# Patient Record
Sex: FEMALE | Race: BLACK OR AFRICAN AMERICAN | NOT HISPANIC OR LATINO
[De-identification: names, ages, dates, MRNs, and addresses within clinical notes are randomized per-mention and may not be internally consistent; named-entity substitution may affect disease eponyms.]

---

## 2018-04-06 ENCOUNTER — APPOINTMENT (OUTPATIENT)
Dept: CARDIOLOGY | Facility: CLINIC | Age: 73
End: 2018-04-06

## 2019-03-22 ENCOUNTER — EMERGENCY (EMERGENCY)
Facility: HOSPITAL | Age: 74
LOS: 1 days | Discharge: HOME | End: 2019-03-22
Attending: EMERGENCY MEDICINE | Admitting: EMERGENCY MEDICINE
Payer: COMMERCIAL

## 2019-03-22 VITALS
RESPIRATION RATE: 20 BRPM | DIASTOLIC BLOOD PRESSURE: 66 MMHG | TEMPERATURE: 100 F | HEART RATE: 66 BPM | SYSTOLIC BLOOD PRESSURE: 146 MMHG | OXYGEN SATURATION: 96 %

## 2019-03-22 LAB
ALBUMIN SERPL ELPH-MCNC: 4 G/DL — SIGNIFICANT CHANGE UP (ref 3.3–5)
ALP SERPL-CCNC: 87 U/L — SIGNIFICANT CHANGE UP (ref 40–120)
ALT FLD-CCNC: 23 U/L — SIGNIFICANT CHANGE UP (ref 4–33)
ANION GAP SERPL CALC-SCNC: 15 MMO/L — HIGH (ref 7–14)
ANION GAP SERPL CALC-SCNC: 17 MMO/L — HIGH (ref 7–14)
AST SERPL-CCNC: 46 U/L — HIGH (ref 4–32)
B PERT DNA SPEC QL NAA+PROBE: NOT DETECTED — SIGNIFICANT CHANGE UP
BASE EXCESS BLDV CALC-SCNC: 8.7 MMOL/L — SIGNIFICANT CHANGE UP
BASOPHILS # BLD AUTO: 0.03 K/UL — SIGNIFICANT CHANGE UP (ref 0–0.2)
BASOPHILS NFR BLD AUTO: 0.3 % — SIGNIFICANT CHANGE UP (ref 0–2)
BILIRUB SERPL-MCNC: 0.3 MG/DL — SIGNIFICANT CHANGE UP (ref 0.2–1.2)
BLOOD GAS VENOUS - CREATININE: 0.63 MG/DL — SIGNIFICANT CHANGE UP (ref 0.5–1.3)
BUN SERPL-MCNC: 14 MG/DL — SIGNIFICANT CHANGE UP (ref 7–23)
BUN SERPL-MCNC: 15 MG/DL — SIGNIFICANT CHANGE UP (ref 7–23)
C PNEUM DNA SPEC QL NAA+PROBE: NOT DETECTED — SIGNIFICANT CHANGE UP
CALCIUM SERPL-MCNC: 8.3 MG/DL — LOW (ref 8.4–10.5)
CALCIUM SERPL-MCNC: 9.1 MG/DL — SIGNIFICANT CHANGE UP (ref 8.4–10.5)
CHLORIDE BLDV-SCNC: 97 MMOL/L — SIGNIFICANT CHANGE UP (ref 96–108)
CHLORIDE SERPL-SCNC: 95 MMOL/L — LOW (ref 98–107)
CHLORIDE SERPL-SCNC: 98 MMOL/L — SIGNIFICANT CHANGE UP (ref 98–107)
CO2 SERPL-SCNC: 21 MMOL/L — LOW (ref 22–31)
CO2 SERPL-SCNC: 28 MMOL/L — SIGNIFICANT CHANGE UP (ref 22–31)
CREAT SERPL-MCNC: 0.79 MG/DL — SIGNIFICANT CHANGE UP (ref 0.5–1.3)
CREAT SERPL-MCNC: 0.84 MG/DL — SIGNIFICANT CHANGE UP (ref 0.5–1.3)
EOSINOPHIL # BLD AUTO: 0 K/UL — SIGNIFICANT CHANGE UP (ref 0–0.5)
EOSINOPHIL NFR BLD AUTO: 0 % — SIGNIFICANT CHANGE UP (ref 0–6)
FLUAV H1 2009 PAND RNA SPEC QL NAA+PROBE: NOT DETECTED — SIGNIFICANT CHANGE UP
FLUAV H1 RNA SPEC QL NAA+PROBE: NOT DETECTED — SIGNIFICANT CHANGE UP
FLUAV H3 RNA SPEC QL NAA+PROBE: NOT DETECTED — SIGNIFICANT CHANGE UP
FLUAV SUBTYP SPEC NAA+PROBE: NOT DETECTED — SIGNIFICANT CHANGE UP
FLUBV RNA SPEC QL NAA+PROBE: NOT DETECTED — SIGNIFICANT CHANGE UP
GAS PNL BLDV: 139 MMOL/L — SIGNIFICANT CHANGE UP (ref 136–146)
GLUCOSE BLDV-MCNC: 101 — HIGH (ref 70–99)
GLUCOSE SERPL-MCNC: 232 MG/DL — HIGH (ref 70–99)
GLUCOSE SERPL-MCNC: 91 MG/DL — SIGNIFICANT CHANGE UP (ref 70–99)
HADV DNA SPEC QL NAA+PROBE: NOT DETECTED — SIGNIFICANT CHANGE UP
HBA1C BLD-MCNC: 5.2 % — SIGNIFICANT CHANGE UP (ref 4–5.6)
HCO3 BLDV-SCNC: 30 MMOL/L — HIGH (ref 20–27)
HCOV PNL SPEC NAA+PROBE: SIGNIFICANT CHANGE UP
HCT VFR BLD CALC: 40.7 % — SIGNIFICANT CHANGE UP (ref 34.5–45)
HCT VFR BLDV CALC: 38.7 % — SIGNIFICANT CHANGE UP (ref 34.5–45)
HGB BLD-MCNC: 12.1 G/DL — SIGNIFICANT CHANGE UP (ref 11.5–15.5)
HGB BLDV-MCNC: 12.6 G/DL — SIGNIFICANT CHANGE UP (ref 11.5–15.5)
HMPV RNA SPEC QL NAA+PROBE: DETECTED — HIGH
HPIV1 RNA SPEC QL NAA+PROBE: NOT DETECTED — SIGNIFICANT CHANGE UP
HPIV2 RNA SPEC QL NAA+PROBE: NOT DETECTED — SIGNIFICANT CHANGE UP
HPIV3 RNA SPEC QL NAA+PROBE: NOT DETECTED — SIGNIFICANT CHANGE UP
HPIV4 RNA SPEC QL NAA+PROBE: NOT DETECTED — SIGNIFICANT CHANGE UP
IMM GRANULOCYTES NFR BLD AUTO: 1.2 % — SIGNIFICANT CHANGE UP (ref 0–1.5)
LACTATE BLDV-MCNC: 2.2 MMOL/L — HIGH (ref 0.5–2)
LYMPHOCYTES # BLD AUTO: 2.18 K/UL — SIGNIFICANT CHANGE UP (ref 1–3.3)
LYMPHOCYTES # BLD AUTO: 23.1 % — SIGNIFICANT CHANGE UP (ref 13–44)
MAGNESIUM SERPL-MCNC: 2.5 MG/DL — SIGNIFICANT CHANGE UP (ref 1.6–2.6)
MCHC RBC-ENTMCNC: 29.7 % — LOW (ref 32–36)
MCHC RBC-ENTMCNC: 29.7 PG — SIGNIFICANT CHANGE UP (ref 27–34)
MCV RBC AUTO: 100 FL — SIGNIFICANT CHANGE UP (ref 80–100)
MONOCYTES # BLD AUTO: 1.3 K/UL — HIGH (ref 0–0.9)
MONOCYTES NFR BLD AUTO: 13.8 % — SIGNIFICANT CHANGE UP (ref 2–14)
NEUTROPHILS # BLD AUTO: 5.82 K/UL — SIGNIFICANT CHANGE UP (ref 1.8–7.4)
NEUTROPHILS NFR BLD AUTO: 61.6 % — SIGNIFICANT CHANGE UP (ref 43–77)
NRBC # FLD: 0 K/UL — LOW (ref 25–125)
NT-PROBNP SERPL-SCNC: 1970 PG/ML — SIGNIFICANT CHANGE UP
PCO2 BLDV: 63 MMHG — HIGH (ref 41–51)
PH BLDV: 7.36 PH — SIGNIFICANT CHANGE UP (ref 7.32–7.43)
PLATELET # BLD AUTO: 315 K/UL — SIGNIFICANT CHANGE UP (ref 150–400)
PMV BLD: 10.6 FL — SIGNIFICANT CHANGE UP (ref 7–13)
PO2 BLDV: 39 MMHG — SIGNIFICANT CHANGE UP (ref 35–40)
POTASSIUM BLDV-SCNC: 5.1 MMOL/L — HIGH (ref 3.4–4.5)
POTASSIUM SERPL-MCNC: 4 MMOL/L — SIGNIFICANT CHANGE UP (ref 3.5–5.3)
POTASSIUM SERPL-MCNC: 5.6 MMOL/L — HIGH (ref 3.5–5.3)
POTASSIUM SERPL-SCNC: 4 MMOL/L — SIGNIFICANT CHANGE UP (ref 3.5–5.3)
POTASSIUM SERPL-SCNC: 5.6 MMOL/L — HIGH (ref 3.5–5.3)
PROT SERPL-MCNC: 8.7 G/DL — HIGH (ref 6–8.3)
RBC # BLD: 4.07 M/UL — SIGNIFICANT CHANGE UP (ref 3.8–5.2)
RBC # FLD: 12.9 % — SIGNIFICANT CHANGE UP (ref 10.3–14.5)
RSV RNA SPEC QL NAA+PROBE: NOT DETECTED — SIGNIFICANT CHANGE UP
RV+EV RNA SPEC QL NAA+PROBE: NOT DETECTED — SIGNIFICANT CHANGE UP
SAO2 % BLDV: 63.6 % — SIGNIFICANT CHANGE UP (ref 60–85)
SODIUM SERPL-SCNC: 136 MMOL/L — SIGNIFICANT CHANGE UP (ref 135–145)
SODIUM SERPL-SCNC: 138 MMOL/L — SIGNIFICANT CHANGE UP (ref 135–145)
WBC # BLD: 9.44 K/UL — SIGNIFICANT CHANGE UP (ref 3.8–10.5)
WBC # FLD AUTO: 9.44 K/UL — SIGNIFICANT CHANGE UP (ref 3.8–10.5)

## 2019-03-22 PROCEDURE — 99218: CPT | Mod: GC

## 2019-03-22 PROCEDURE — 71045 X-RAY EXAM CHEST 1 VIEW: CPT | Mod: 26

## 2019-03-22 RX ORDER — IPRATROPIUM/ALBUTEROL SULFATE 18-103MCG
3 AEROSOL WITH ADAPTER (GRAM) INHALATION ONCE
Qty: 0 | Refills: 0 | Status: COMPLETED | OUTPATIENT
Start: 2019-03-22 | End: 2019-03-22

## 2019-03-22 RX ORDER — APIXABAN 2.5 MG/1
5 TABLET, FILM COATED ORAL ONCE
Qty: 0 | Refills: 0 | Status: COMPLETED | OUTPATIENT
Start: 2019-03-22 | End: 2019-03-22

## 2019-03-22 RX ORDER — ALBUTEROL 90 UG/1
2.5 AEROSOL, METERED ORAL ONCE
Qty: 0 | Refills: 0 | Status: COMPLETED | OUTPATIENT
Start: 2019-03-22 | End: 2019-03-22

## 2019-03-22 RX ORDER — MAGNESIUM SULFATE 500 MG/ML
2 VIAL (ML) INJECTION ONCE
Qty: 0 | Refills: 0 | Status: COMPLETED | OUTPATIENT
Start: 2019-03-22 | End: 2019-03-22

## 2019-03-22 RX ORDER — IPRATROPIUM/ALBUTEROL SULFATE 18-103MCG
3 AEROSOL WITH ADAPTER (GRAM) INHALATION EVERY 4 HOURS
Qty: 0 | Refills: 0 | Status: DISCONTINUED | OUTPATIENT
Start: 2019-03-22 | End: 2019-03-26

## 2019-03-22 RX ORDER — METOPROLOL TARTRATE 50 MG
100 TABLET ORAL ONCE
Qty: 0 | Refills: 0 | Status: COMPLETED | OUTPATIENT
Start: 2019-03-22 | End: 2019-03-22

## 2019-03-22 RX ORDER — FUROSEMIDE 40 MG
40 TABLET ORAL ONCE
Qty: 0 | Refills: 0 | Status: COMPLETED | OUTPATIENT
Start: 2019-03-22 | End: 2019-03-22

## 2019-03-22 RX ADMIN — ALBUTEROL 2.5 MILLIGRAM(S): 90 AEROSOL, METERED ORAL at 19:54

## 2019-03-22 RX ADMIN — Medication 3 MILLILITER(S): at 17:14

## 2019-03-22 RX ADMIN — Medication 3 MILLILITER(S): at 22:49

## 2019-03-22 RX ADMIN — Medication 40 MILLIGRAM(S): at 17:14

## 2019-03-22 RX ADMIN — Medication 3 MILLILITER(S): at 15:15

## 2019-03-22 RX ADMIN — Medication 50 MILLIGRAM(S): at 14:45

## 2019-03-22 RX ADMIN — Medication 3 MILLILITER(S): at 14:45

## 2019-03-22 RX ADMIN — Medication 3 MILLILITER(S): at 15:00

## 2019-03-22 RX ADMIN — Medication 50 GRAM(S): at 17:14

## 2019-03-22 NOTE — ED CDU PROVIDER INITIAL DAY NOTE - MEDICAL DECISION MAKING DETAILS
73 yo F here for cough wheezing s/p zpack and steroids from PMD. PLAN: nebs, 02 monitoring, reassess

## 2019-03-22 NOTE — ED PROVIDER NOTE - ATTENDING CONTRIBUTION TO CARE
Dr. Crane: 73 yo female with CHF, HTN, COPD, in ED with 2 weeks of worsening wheezing and coughing.  Approx 1 week ago pt saw PMD for this and was given azithromycin and steroids with no significant change in symptoms.  She notes subjective fever, no CP, N/V/D.  On exam pt unwell appearing but nontoxic appearing, in moderate respiratory distress, heart tachycardic, lungs moderate wheezing throughout all lung fields, abd NTND, extremities without swelling, strength 5/5 in all extremities and skin without rash.

## 2019-03-22 NOTE — ED PROVIDER NOTE - OBJECTIVE STATEMENT
74F with pmh CHF, HTN, COPD presenting with dry cough and wheeze for 2 weeks worsening. Seen by PMD about 1 week ago prescribed course of azithromycin, tessalon perles and steroids with no improvement. Patient endorses subjective fevers as well. No recent hospitalizations. No hx of intubations. Denies cp, n/v/d, dizziness, headache, dysuria

## 2019-03-22 NOTE — ED CDU PROVIDER INITIAL DAY NOTE - ATTENDING CONTRIBUTION TO CARE
74 F p/w cough, wheeze and SOB x 2 weeks, progressive despite getting z-pack 1 week ago.  Pt reports is on AC for ?afib.  Got nebs, steroids, mag while here, CO2 63 but pt awake, proBNP is elevated but pt is on lasix at home (didn’t take this med today).  Pt able to walk 15 steps w/o significant hypoxia.  OK for CDU; check RVP and rx PO lasix.  Nebs intermittent, steroids, home meds incl AC and lasix, reassess in AM.  Pt has biPAP which she uses for LEIDY at home, OK for use tonight if needed.    VS: afebrile, htn    GEN -malaise, mild distress cough, A+O x3   HEAD - NC/AT     ENT - PEERL, EOMI, mucous membranes  moist , no discharge      NECK: Neck supple, non-tender without lymphadenopathy, no masses, (+) JVD  PULM - bilat wheeze,  symmetric breath sounds  COR -  normal heart sounds    ABD - , ND, NT, soft,  BACK - no CVA tenderness, nontender spine     EXTREMS - (+)1 edema, no deformity, warm and well perfused    SKIN - no rash or bruising      NEUROLOGIC - alert, CN 2-12 intact, sensation nl, motor no focal deficit.

## 2019-03-22 NOTE — ED PROVIDER NOTE - NS ED ATTENDING STATEMENT MOD
prescription called to pharmacy
I have personally seen and examined this patient.  I have fully participated in the care of this patient. I have reviewed all pertinent clinical information, including history, physical exam, plan and the Resident’s note and agree except as noted.

## 2019-03-22 NOTE — ED ADULT TRIAGE NOTE - BP NONINVASIVE SYSTOLIC (MM HG)
How Severe Is Your Acne?: mild Females Only: When Was Your Last Menstrual Period?: 05/20/2018 Additional Comments (Use Complete Sentences): Accutane check up! Everything fine, except my nose is still super dry and crusty. I’ve been using saline spray and aquaphor intensive care 146

## 2019-03-22 NOTE — ED ADULT NURSE NOTE - OBJECTIVE STATEMENT
Received patient to room 29 with c/o cough, shortness of breath and difficulty breathing. Pt evaluated by MD. IVL placed to right AC 20 gauge and labs drawn and sent. Pt receiving medication as ordered and waiting for results, further orders and disposition.  KARYNA Mendez

## 2019-03-22 NOTE — ED PROVIDER NOTE - CLINICAL SUMMARY MEDICAL DECISION MAKING FREE TEXT BOX
74F presenting with dry cough, wheeze, subjective fever x 2 weeks. Exam with significant b/l wheeze, likely COPD exacerbation. Will treat symptomatically, assess for possible PNA, reassess

## 2019-03-22 NOTE — ED ADULT TRIAGE NOTE - CHIEF COMPLAINT QUOTE
c/o dry cough with SOB, intermittent subjective fevers/chills x 2 weeks. C/o pain under left breast x 1 week. Hx COPD asthma. Labored breathing with exertion and audible wheezing in triage.

## 2019-03-23 VITALS — HEART RATE: 75 BPM | OXYGEN SATURATION: 95 %

## 2019-03-23 LAB
ALBUMIN SERPL ELPH-MCNC: 3.7 G/DL — SIGNIFICANT CHANGE UP (ref 3.3–5)
ALP SERPL-CCNC: 78 U/L — SIGNIFICANT CHANGE UP (ref 40–120)
ALT FLD-CCNC: 15 U/L — SIGNIFICANT CHANGE UP (ref 4–33)
ANION GAP SERPL CALC-SCNC: 12 MMO/L — SIGNIFICANT CHANGE UP (ref 7–14)
AST SERPL-CCNC: 17 U/L — SIGNIFICANT CHANGE UP (ref 4–32)
BASOPHILS # BLD AUTO: 0.03 K/UL — SIGNIFICANT CHANGE UP (ref 0–0.2)
BASOPHILS NFR BLD AUTO: 0.4 % — SIGNIFICANT CHANGE UP (ref 0–2)
BILIRUB SERPL-MCNC: 0.3 MG/DL — SIGNIFICANT CHANGE UP (ref 0.2–1.2)
BUN SERPL-MCNC: 18 MG/DL — SIGNIFICANT CHANGE UP (ref 7–23)
CALCIUM SERPL-MCNC: 8.7 MG/DL — SIGNIFICANT CHANGE UP (ref 8.4–10.5)
CHLORIDE SERPL-SCNC: 97 MMOL/L — LOW (ref 98–107)
CO2 SERPL-SCNC: 30 MMOL/L — SIGNIFICANT CHANGE UP (ref 22–31)
CREAT SERPL-MCNC: 0.92 MG/DL — SIGNIFICANT CHANGE UP (ref 0.5–1.3)
EOSINOPHIL # BLD AUTO: 0 K/UL — SIGNIFICANT CHANGE UP (ref 0–0.5)
EOSINOPHIL NFR BLD AUTO: 0 % — SIGNIFICANT CHANGE UP (ref 0–6)
GLUCOSE SERPL-MCNC: 118 MG/DL — HIGH (ref 70–99)
HCT VFR BLD CALC: 36.1 % — SIGNIFICANT CHANGE UP (ref 34.5–45)
HGB BLD-MCNC: 11 G/DL — LOW (ref 11.5–15.5)
IMM GRANULOCYTES NFR BLD AUTO: 1.1 % — SIGNIFICANT CHANGE UP (ref 0–1.5)
LYMPHOCYTES # BLD AUTO: 2.15 K/UL — SIGNIFICANT CHANGE UP (ref 1–3.3)
LYMPHOCYTES # BLD AUTO: 27.2 % — SIGNIFICANT CHANGE UP (ref 13–44)
MCHC RBC-ENTMCNC: 29.6 PG — SIGNIFICANT CHANGE UP (ref 27–34)
MCHC RBC-ENTMCNC: 30.5 % — LOW (ref 32–36)
MCV RBC AUTO: 97.3 FL — SIGNIFICANT CHANGE UP (ref 80–100)
MONOCYTES # BLD AUTO: 0.68 K/UL — SIGNIFICANT CHANGE UP (ref 0–0.9)
MONOCYTES NFR BLD AUTO: 8.6 % — SIGNIFICANT CHANGE UP (ref 2–14)
NEUTROPHILS # BLD AUTO: 4.94 K/UL — SIGNIFICANT CHANGE UP (ref 1.8–7.4)
NEUTROPHILS NFR BLD AUTO: 62.7 % — SIGNIFICANT CHANGE UP (ref 43–77)
NRBC # FLD: 0 K/UL — LOW (ref 25–125)
PLATELET # BLD AUTO: 297 K/UL — SIGNIFICANT CHANGE UP (ref 150–400)
PMV BLD: 10.5 FL — SIGNIFICANT CHANGE UP (ref 7–13)
POTASSIUM SERPL-MCNC: 3.9 MMOL/L — SIGNIFICANT CHANGE UP (ref 3.5–5.3)
POTASSIUM SERPL-SCNC: 3.9 MMOL/L — SIGNIFICANT CHANGE UP (ref 3.5–5.3)
PROT SERPL-MCNC: 7.5 G/DL — SIGNIFICANT CHANGE UP (ref 6–8.3)
RBC # BLD: 3.71 M/UL — LOW (ref 3.8–5.2)
RBC # FLD: 12.8 % — SIGNIFICANT CHANGE UP (ref 10.3–14.5)
SODIUM SERPL-SCNC: 139 MMOL/L — SIGNIFICANT CHANGE UP (ref 135–145)
WBC # BLD: 7.89 K/UL — SIGNIFICANT CHANGE UP (ref 3.8–10.5)
WBC # FLD AUTO: 7.89 K/UL — SIGNIFICANT CHANGE UP (ref 3.8–10.5)

## 2019-03-23 PROCEDURE — 99217: CPT | Mod: GC

## 2019-03-23 PROCEDURE — 93306 TTE W/DOPPLER COMPLETE: CPT | Mod: 26

## 2019-03-23 RX ORDER — ALBUTEROL 90 UG/1
3 AEROSOL, METERED ORAL
Qty: 1 | Refills: 0
Start: 2019-03-23 | End: 2019-04-06

## 2019-03-23 RX ORDER — POTASSIUM CHLORIDE 20 MEQ
20 PACKET (EA) ORAL DAILY
Qty: 0 | Refills: 0 | Status: DISCONTINUED | OUTPATIENT
Start: 2019-03-23 | End: 2019-03-26

## 2019-03-23 RX ORDER — APIXABAN 2.5 MG/1
5 TABLET, FILM COATED ORAL EVERY 12 HOURS
Qty: 0 | Refills: 0 | Status: DISCONTINUED | OUTPATIENT
Start: 2019-03-23 | End: 2019-03-26

## 2019-03-23 RX ORDER — METOPROLOL TARTRATE 50 MG
100 TABLET ORAL
Qty: 0 | Refills: 0 | Status: DISCONTINUED | OUTPATIENT
Start: 2019-03-23 | End: 2019-03-26

## 2019-03-23 RX ORDER — FUROSEMIDE 40 MG
40 TABLET ORAL DAILY
Qty: 0 | Refills: 0 | Status: DISCONTINUED | OUTPATIENT
Start: 2019-03-23 | End: 2019-03-26

## 2019-03-23 RX ORDER — LISINOPRIL 2.5 MG/1
1 TABLET ORAL
Qty: 0 | Refills: 0 | COMMUNITY

## 2019-03-23 RX ORDER — LISINOPRIL 2.5 MG/1
40 TABLET ORAL DAILY
Qty: 0 | Refills: 0 | Status: DISCONTINUED | OUTPATIENT
Start: 2019-03-23 | End: 2019-03-26

## 2019-03-23 RX ORDER — AMLODIPINE BESYLATE 2.5 MG/1
10 TABLET ORAL DAILY
Qty: 0 | Refills: 0 | Status: DISCONTINUED | OUTPATIENT
Start: 2019-03-23 | End: 2019-03-26

## 2019-03-23 RX ADMIN — APIXABAN 5 MILLIGRAM(S): 2.5 TABLET, FILM COATED ORAL at 12:19

## 2019-03-23 RX ADMIN — Medication 20 MILLIEQUIVALENT(S): at 12:19

## 2019-03-23 RX ADMIN — Medication 3 MILLILITER(S): at 09:05

## 2019-03-23 RX ADMIN — Medication 3 MILLILITER(S): at 06:31

## 2019-03-23 RX ADMIN — AMLODIPINE BESYLATE 10 MILLIGRAM(S): 2.5 TABLET ORAL at 07:28

## 2019-03-23 RX ADMIN — Medication 40 MILLIGRAM(S): at 09:05

## 2019-03-23 RX ADMIN — LISINOPRIL 40 MILLIGRAM(S): 2.5 TABLET ORAL at 07:28

## 2019-03-23 RX ADMIN — Medication 3 MILLILITER(S): at 17:31

## 2019-03-23 RX ADMIN — Medication 3 MILLILITER(S): at 02:30

## 2019-03-23 RX ADMIN — APIXABAN 5 MILLIGRAM(S): 2.5 TABLET, FILM COATED ORAL at 00:53

## 2019-03-23 RX ADMIN — Medication 100 MILLIGRAM(S): at 00:53

## 2019-03-23 RX ADMIN — Medication 3 MILLILITER(S): at 13:08

## 2019-03-23 RX ADMIN — Medication 50 MILLIGRAM(S): at 07:28

## 2019-03-23 RX ADMIN — Medication 100 MILLIGRAM(S): at 17:30

## 2019-03-23 NOTE — ED CDU PROVIDER SUBSEQUENT DAY NOTE - PROGRESS NOTE DETAILS
Pt objectively noted to be resting comfortably; pt will be signed out to the day CDU PA (Shawn) and attending (Dr. Grimm) at 0700 hrs. Pt with diffuse wheeze on exam this morning on am rounds. Will continue to give nebs and reassess, pending echo Sirisha att: Patient reports feeling better, wants to go home. CTABL, sat 95% ra (copd), walking without dyspnea. Echo EF 40% with moderate global LV dysfunction. Patient with known chf but no prior official or documented echo in [facility]. Called PCP Jelly Estevez @ [phone], per covering MD office is closed Saturday and he cannot access the records. Verbally left message detailing, 74F p/w COPD exacerbation 2/2 HMPV and echo EF 40% with severe LV dysfunction no prior baseline." Explained patient ambulatory without dyspnea, nl, feeling better, will dc to f/u with results in office. Covering MD in agreement, will ensure PCP receives message.

## 2019-03-23 NOTE — ED CDU PROVIDER DISPOSITION NOTE - NSFOLLOWUPINSTRUCTIONS_ED_ALL_ED_FT
Please provide these papers to your primary doctor. You were seen in the Emergency Room for shortness of breath, fever and chills. You were diagnosed with COPD exacerbation from a virus called human meta pneumo virus which can increase shortness of breath and wheezing. This was treated with steroids and nebulizer treatments. You also had a heart ultrasound (echo) that showed moderate left ventricular dysfunction with heart squeeze of 40%. You have no prior echo in our hospital system and your primary doctor's office is closed on Saturday to provide comparison. Following discharge (1) please all of your home medication as prescribed and take your Eliquis 5 mg one tab TWICE A DAY not once a day. (2) Please take Prednisone a steroid to help with your lungs 20 mg two tabs Sunday, Monday, and Tuesday. Take Prednisone 20 mg one tab Wednesday, Thursday, and Friday. (3) Please take your nebulizer every 4 hours as needed for shortness of breath, wheezing, or chest tightness. (4) Please make an appointment to see your primary doctor, Dr. Estevez, in 48 hours for follow-up care. (5) If at any time you feel more ill, please return immediately to the Emergency Room with these papers.

## 2019-03-23 NOTE — ED CDU PROVIDER SUBSEQUENT DAY NOTE - RESPIRATORY, MLM
Breath sounds equal bilaterally; +bilateral expiratory wheezing throughout; no retractions/dyspnea/tachypnea objectively noted.  Speech is clear and effortless.

## 2019-03-23 NOTE — ED CDU PROVIDER DISPOSITION NOTE - CLINICAL COURSE
74F with pmh CHF, HTN, COPD p/w copd exacerbation 2/2 humanmetapneumovirus. Past 2 wks dry cough and wheeze, completed 5 days z-anthony and steroids without improvement. CDU for continued nebs and steroids. Overnight improvement in cough and wheeze. AM patient seated upright, comfortable, watching TV. Reports she just completed a neb treatment and feels well. PE nad, loud diffuse wheeze with good air entry, rrr, s1s2, abd soft ntnd. PE unremarkable. CDU COURSE echo result nl, pt ambulatory with dyspnea and reports feels better. DC home, instructiosn reviewed with patient and 2 family members.

## 2019-03-23 NOTE — ED CDU PROVIDER SUBSEQUENT DAY NOTE - ATTENDING CONTRIBUTION TO CARE
Dr. Grimm: I performed a face to face bedside interview with patient regarding history of present illness, review of symptoms and past medical history. I completed an independent physical exam.  I have discussed patient's plan of care with PA.   I agree with note as stated above, having amended the EMR as needed to reflect my findings.   This includes HISTORY OF PRESENT ILLNESS, HIV, PAST MEDICAL/SURGICAL/FAMILY/SOCIAL HISTORY, ALLERGIES AND HOME MEDICATIONS, REVIEW OF SYSTEMS, PHYSICAL EXAM, and any PROGRESS NOTES during the time I functioned as the attending physician for this patient. 74F with pmh CHF, HTN, COPD p/w copd exacerbation 2/2 humanmetapneumovirus. Past 2 wks dry cough and wheeze, completed 5 days z-anthony and steroids without improvement. CDU for continued nebs and steroids. Overnight improvement in cough and wheeze. PE unremarkable. PLAN if patient feels better, ambulates without dyspnea, ok to dc home with nebs and steroids. Dr. Grimm: I performed a face to face bedside interview with patient regarding history of present illness, review of symptoms and past medical history. I completed an independent physical exam.  I have discussed patient's plan of care with PA.   I agree with note as stated above, having amended the EMR as needed to reflect my findings.   This includes HISTORY OF PRESENT ILLNESS, HIV, PAST MEDICAL/SURGICAL/FAMILY/SOCIAL HISTORY, ALLERGIES AND HOME MEDICATIONS, REVIEW OF SYSTEMS, PHYSICAL EXAM, and any PROGRESS NOTES during the time I functioned as the attending physician for this patient. 74F with pmh CHF, HTN, COPD p/w copd exacerbation 2/2 humanmetapneumovirus. Past 2 wks dry cough and wheeze, completed 5 days z-anthony and steroids without improvement. CDU for continued nebs and steroids. Overnight improvement in cough and wheeze. AM patient seated upright, comfortable, watching TV. Reports she just completed a neb treatment and feels well. PE nad, loud diffuse wheeze with good air entry, rrr, s1s2, abd soft ntnd. PE unremarkable. CDU DAY PLAN echo result, if nl, ok for home with steroid and nebs.

## 2019-03-23 NOTE — ED CDU PROVIDER SUBSEQUENT DAY NOTE - MEDICAL DECISION MAKING DETAILS
75 yo F here for cough wheezing s/p zpack and steroids from PMD. PLAN: nebs, 02 monitoring, reassess

## 2019-03-23 NOTE — ED CDU PROVIDER SUBSEQUENT DAY NOTE - PMH
Atrial fibrillation    CHF (congestive heart failure)    COPD (chronic obstructive pulmonary disease)    HTN (hypertension)    Morbid obesity    Sleep apnea  (on CPAP, unknown home settings)

## 2019-03-23 NOTE — ED CDU PROVIDER SUBSEQUENT DAY NOTE - HISTORY
CDU Initial Note HPI: "74F with pmh CHF, HTN, COPD presenting with dry cough and wheeze for 2 weeks worsening. Seen by PMD about 1 week ago prescribed course of azithromycin, tessalon perles and steroids with no improvement. Patient endorses subjective fevers as well. No recent hospitalizations. No hx of intubations. Denies cp, n/v/d, dizziness, headache, dysuria"  Pt was evaluated in the ED; dispo'd to CDU for continued care/nebs for COPD exacerbation / URI.  RVP was positive for hMPV, pt verbalizing feeling much improvement in overall status vs initial ED arrival.  No issues or c/o in the interim.

## 2019-11-12 NOTE — ED PROVIDER NOTE - EKG ADDITIONAL QUESTION - PERFORMED INDEPENDENT VISUALIZATION
[General Appearance - Alert] : alert [General Appearance - In No Acute Distress] : in no acute distress [General Appearance - Well-Appearing] : healthy appearing [PERRL With Normal Accommodation] : pupils were equal in size, round, and reactive to light [Sclera] : the sclera and conjunctiva were normal [Extraocular Movements] : extraocular movements were intact [Optic Disc Abnormality] : the optic disc were normal in size and color [Outer Ear] : the ears and nose were normal in appearance [Normal Oral Mucosa] : normal oral mucosa [Hearing Threshold Finger Rub Not [place]] : hearing was normal [Both Tympanic Membranes Were Examined] : both tympanic membranes were normal [Oropharynx] : The oropharynx was normal [Neck Appearance] : the appearance of the neck was normal [Neck Cervical Mass (___cm)] : no neck mass was observed [Respiration, Rhythm And Depth] : normal respiratory rhythm and effort [Auscultation Breath Sounds / Voice Sounds] : lungs were clear to auscultation bilaterally [Chest Palpation] : palpation of the chest revealed no abnormalities [Lungs Percussion] : the lungs were normal to percussion [Apical Impulse] : the apical impulse was normal [Heart Rate And Rhythm] : heart rate was normal and rhythm regular [Heart Sounds] : normal S1 and S2 [Heart Sounds Gallop] : no gallops [Murmurs] : no murmurs [Arterial Pulses Carotid] : carotid pulses were normal with no bruits [Heart Sounds Pericardial Friction Rub] : no pericardial rub [Arterial Pulses Femoral] : femoral pulses were normal without bruits [Full Pulse] : the pedal pulses are present [Edema] : there was no peripheral edema [Bowel Sounds] : normal bowel sounds [Abdomen Soft] : soft [Abdomen Mass (___ Cm)] : no abdominal mass palpated [Axillary Lymph Nodes Enlarged Bilaterally] : axillary [Cervical Lymph Nodes Enlarged Posterior Bilaterally] : posterior cervical [Supraclavicular Lymph Nodes Enlarged Bilaterally] : supraclavicular [No Spinal Tenderness] : no spinal tenderness [Abnormal Walk] : normal gait [Nail Clubbing] : no clubbing  or cyanosis of the fingernails [Involuntary Movements] : no involuntary movements were seen [Motor Tone] : muscle strength and tone were normal [] : no rash [Skin Lesions] : no skin lesions [Cranial Nerves] : cranial nerves 2-12 were intact [Sensation] : the sensory exam was normal to light touch and pinprick [Deep Tendon Reflexes (DTR)] : deep tendon reflexes were 2+ and symmetric [Motor Exam] : the motor exam was normal Yes [Impaired Insight] : insight and judgment were intact [No Focal Deficits] : no focal deficits [Affect] : the affect was normal [Mood] : the mood was normal [Memory Recent] : recent memory was not impaired [FreeTextEntry1] : erythema nasal mucosa left >right

## 2022-11-14 ENCOUNTER — EMERGENCY (EMERGENCY)
Facility: HOSPITAL | Age: 77
LOS: 1 days | Discharge: HOME | End: 2022-11-14
Attending: STUDENT IN AN ORGANIZED HEALTH CARE EDUCATION/TRAINING PROGRAM | Admitting: STUDENT IN AN ORGANIZED HEALTH CARE EDUCATION/TRAINING PROGRAM

## 2022-11-14 VITALS
HEART RATE: 85 BPM | DIASTOLIC BLOOD PRESSURE: 99 MMHG | TEMPERATURE: 98 F | RESPIRATION RATE: 20 BRPM | OXYGEN SATURATION: 94 % | SYSTOLIC BLOOD PRESSURE: 152 MMHG

## 2022-11-14 VITALS
TEMPERATURE: 98 F | OXYGEN SATURATION: 94 % | DIASTOLIC BLOOD PRESSURE: 80 MMHG | HEART RATE: 81 BPM | RESPIRATION RATE: 16 BRPM | SYSTOLIC BLOOD PRESSURE: 142 MMHG

## 2022-11-14 PROBLEM — J44.9 CHRONIC OBSTRUCTIVE PULMONARY DISEASE, UNSPECIFIED: Chronic | Status: ACTIVE | Noted: 2019-03-22

## 2022-11-14 PROBLEM — I48.91 UNSPECIFIED ATRIAL FIBRILLATION: Chronic | Status: ACTIVE | Noted: 2019-03-23

## 2022-11-14 PROBLEM — G47.30 SLEEP APNEA, UNSPECIFIED: Chronic | Status: ACTIVE | Noted: 2019-03-23

## 2022-11-14 PROBLEM — E66.01 MORBID (SEVERE) OBESITY DUE TO EXCESS CALORIES: Chronic | Status: ACTIVE | Noted: 2019-03-23

## 2022-11-14 PROBLEM — I10 ESSENTIAL (PRIMARY) HYPERTENSION: Chronic | Status: ACTIVE | Noted: 2019-03-22

## 2022-11-14 PROBLEM — I50.9 HEART FAILURE, UNSPECIFIED: Chronic | Status: ACTIVE | Noted: 2019-03-22

## 2022-11-14 LAB
ALBUMIN SERPL ELPH-MCNC: 4.3 G/DL — SIGNIFICANT CHANGE UP (ref 3.3–5)
ALP SERPL-CCNC: 94 U/L — SIGNIFICANT CHANGE UP (ref 40–120)
ALT FLD-CCNC: 23 U/L — SIGNIFICANT CHANGE UP (ref 4–33)
ANION GAP SERPL CALC-SCNC: 15 MMOL/L — HIGH (ref 7–14)
AST SERPL-CCNC: 21 U/L — SIGNIFICANT CHANGE UP (ref 4–32)
B PERT DNA SPEC QL NAA+PROBE: SIGNIFICANT CHANGE UP
B PERT+PARAPERT DNA PNL SPEC NAA+PROBE: SIGNIFICANT CHANGE UP
BASE EXCESS BLDV CALC-SCNC: 10.1 MMOL/L — HIGH (ref -2–3)
BASOPHILS # BLD AUTO: 0.03 K/UL — SIGNIFICANT CHANGE UP (ref 0–0.2)
BASOPHILS NFR BLD AUTO: 0.4 % — SIGNIFICANT CHANGE UP (ref 0–2)
BILIRUB SERPL-MCNC: 0.5 MG/DL — SIGNIFICANT CHANGE UP (ref 0.2–1.2)
BLOOD GAS VENOUS COMPREHENSIVE RESULT: SIGNIFICANT CHANGE UP
BORDETELLA PARAPERTUSSIS (RAPRVP): SIGNIFICANT CHANGE UP
BUN SERPL-MCNC: 18 MG/DL — SIGNIFICANT CHANGE UP (ref 7–23)
C PNEUM DNA SPEC QL NAA+PROBE: SIGNIFICANT CHANGE UP
CALCIUM SERPL-MCNC: 9.1 MG/DL — SIGNIFICANT CHANGE UP (ref 8.4–10.5)
CHLORIDE BLDV-SCNC: 101 MMOL/L — SIGNIFICANT CHANGE UP (ref 96–108)
CHLORIDE SERPL-SCNC: 100 MMOL/L — SIGNIFICANT CHANGE UP (ref 98–107)
CO2 BLDV-SCNC: 38 MMOL/L — HIGH (ref 22–26)
CO2 SERPL-SCNC: 28 MMOL/L — SIGNIFICANT CHANGE UP (ref 22–31)
CREAT SERPL-MCNC: 0.77 MG/DL — SIGNIFICANT CHANGE UP (ref 0.5–1.3)
EGFR: 80 ML/MIN/1.73M2 — SIGNIFICANT CHANGE UP
EOSINOPHIL # BLD AUTO: 0.11 K/UL — SIGNIFICANT CHANGE UP (ref 0–0.5)
EOSINOPHIL NFR BLD AUTO: 1.4 % — SIGNIFICANT CHANGE UP (ref 0–6)
FLUAV SUBTYP SPEC NAA+PROBE: SIGNIFICANT CHANGE UP
FLUBV RNA SPEC QL NAA+PROBE: SIGNIFICANT CHANGE UP
GAS PNL BLDV: 138 MMOL/L — SIGNIFICANT CHANGE UP (ref 136–145)
GLUCOSE BLDV-MCNC: 111 MG/DL — HIGH (ref 70–99)
GLUCOSE SERPL-MCNC: 110 MG/DL — HIGH (ref 70–99)
HADV DNA SPEC QL NAA+PROBE: SIGNIFICANT CHANGE UP
HCO3 BLDV-SCNC: 36 MMOL/L — HIGH (ref 22–29)
HCOV 229E RNA SPEC QL NAA+PROBE: SIGNIFICANT CHANGE UP
HCOV HKU1 RNA SPEC QL NAA+PROBE: SIGNIFICANT CHANGE UP
HCOV NL63 RNA SPEC QL NAA+PROBE: SIGNIFICANT CHANGE UP
HCOV OC43 RNA SPEC QL NAA+PROBE: SIGNIFICANT CHANGE UP
HCT VFR BLD CALC: 37.2 % — SIGNIFICANT CHANGE UP (ref 34.5–45)
HCT VFR BLDA CALC: 35 % — SIGNIFICANT CHANGE UP (ref 34.5–46.5)
HGB BLD CALC-MCNC: 11.5 G/DL — SIGNIFICANT CHANGE UP (ref 11.5–15.5)
HGB BLD-MCNC: 11.4 G/DL — LOW (ref 11.5–15.5)
HMPV RNA SPEC QL NAA+PROBE: SIGNIFICANT CHANGE UP
HPIV1 RNA SPEC QL NAA+PROBE: SIGNIFICANT CHANGE UP
HPIV2 RNA SPEC QL NAA+PROBE: SIGNIFICANT CHANGE UP
HPIV3 RNA SPEC QL NAA+PROBE: SIGNIFICANT CHANGE UP
HPIV4 RNA SPEC QL NAA+PROBE: SIGNIFICANT CHANGE UP
IANC: 5.73 K/UL — SIGNIFICANT CHANGE UP (ref 1.8–7.4)
IMM GRANULOCYTES NFR BLD AUTO: 0.5 % — SIGNIFICANT CHANGE UP (ref 0–0.9)
LACTATE BLDV-MCNC: 1 MMOL/L — SIGNIFICANT CHANGE UP (ref 0.5–2)
LYMPHOCYTES # BLD AUTO: 1.39 K/UL — SIGNIFICANT CHANGE UP (ref 1–3.3)
LYMPHOCYTES # BLD AUTO: 17.8 % — SIGNIFICANT CHANGE UP (ref 13–44)
M PNEUMO DNA SPEC QL NAA+PROBE: SIGNIFICANT CHANGE UP
MCHC RBC-ENTMCNC: 30.6 GM/DL — LOW (ref 32–36)
MCHC RBC-ENTMCNC: 31.2 PG — SIGNIFICANT CHANGE UP (ref 27–34)
MCV RBC AUTO: 101.9 FL — HIGH (ref 80–100)
MONOCYTES # BLD AUTO: 0.53 K/UL — SIGNIFICANT CHANGE UP (ref 0–0.9)
MONOCYTES NFR BLD AUTO: 6.8 % — SIGNIFICANT CHANGE UP (ref 2–14)
NEUTROPHILS # BLD AUTO: 5.73 K/UL — SIGNIFICANT CHANGE UP (ref 1.8–7.4)
NEUTROPHILS NFR BLD AUTO: 73.1 % — SIGNIFICANT CHANGE UP (ref 43–77)
NRBC # BLD: 0 /100 WBCS — SIGNIFICANT CHANGE UP (ref 0–0)
NRBC # FLD: 0 K/UL — SIGNIFICANT CHANGE UP (ref 0–0)
NT-PROBNP SERPL-SCNC: 1906 PG/ML — HIGH
PCO2 BLDV: 56 MMHG — HIGH (ref 39–42)
PH BLDV: 7.42 — SIGNIFICANT CHANGE UP (ref 7.32–7.43)
PLATELET # BLD AUTO: 301 K/UL — SIGNIFICANT CHANGE UP (ref 150–400)
PO2 BLDV: 73 MMHG — SIGNIFICANT CHANGE UP
POTASSIUM BLDV-SCNC: 3.3 MMOL/L — LOW (ref 3.5–5.1)
POTASSIUM SERPL-MCNC: 3.7 MMOL/L — SIGNIFICANT CHANGE UP (ref 3.5–5.3)
POTASSIUM SERPL-SCNC: 3.7 MMOL/L — SIGNIFICANT CHANGE UP (ref 3.5–5.3)
PROT SERPL-MCNC: 8.5 G/DL — HIGH (ref 6–8.3)
RAPID RVP RESULT: SIGNIFICANT CHANGE UP
RBC # BLD: 3.65 M/UL — LOW (ref 3.8–5.2)
RBC # FLD: 11.9 % — SIGNIFICANT CHANGE UP (ref 10.3–14.5)
RSV RNA SPEC QL NAA+PROBE: SIGNIFICANT CHANGE UP
RV+EV RNA SPEC QL NAA+PROBE: SIGNIFICANT CHANGE UP
SAO2 % BLDV: 94.4 % — SIGNIFICANT CHANGE UP
SARS-COV-2 RNA SPEC QL NAA+PROBE: SIGNIFICANT CHANGE UP
SODIUM SERPL-SCNC: 143 MMOL/L — SIGNIFICANT CHANGE UP (ref 135–145)
TROPONIN T, HIGH SENSITIVITY RESULT: 15 NG/L — SIGNIFICANT CHANGE UP
WBC # BLD: 7.83 K/UL — SIGNIFICANT CHANGE UP (ref 3.8–10.5)
WBC # FLD AUTO: 7.83 K/UL — SIGNIFICANT CHANGE UP (ref 3.8–10.5)

## 2022-11-14 PROCEDURE — 93010 ELECTROCARDIOGRAM REPORT: CPT

## 2022-11-14 PROCEDURE — 71045 X-RAY EXAM CHEST 1 VIEW: CPT | Mod: 26

## 2022-11-14 PROCEDURE — 99285 EMERGENCY DEPT VISIT HI MDM: CPT

## 2022-11-14 RX ORDER — IPRATROPIUM/ALBUTEROL SULFATE 18-103MCG
3 AEROSOL WITH ADAPTER (GRAM) INHALATION ONCE
Refills: 0 | Status: COMPLETED | OUTPATIENT
Start: 2022-11-14 | End: 2022-11-14

## 2022-11-14 RX ADMIN — Medication 3 MILLILITER(S): at 14:44

## 2022-11-14 RX ADMIN — Medication 125 MILLIGRAM(S): at 14:44

## 2022-11-14 NOTE — ED PROVIDER NOTE - PROGRESS NOTE DETAILS
Shalini Nicholas, PGY-1 DO:  Patient states that she is feeling better no longer having SOB. Ambulatory sat 94%. patient will be sent home with return precautions.

## 2022-11-14 NOTE — ED PROVIDER NOTE - ATTENDING CONTRIBUTION TO CARE
I have personally performed a face to face medical and diagnostic evaluation of the patient. I have discussed with and reviewed the Resident's note and agree with the History, ROS, Physical Exam and MDM unless otherwise indicated. A brief summary of my personal evaluation and impression can be found below.    77y F w/ PMHx HTN, COPD, CHF, and A-fib on AC p/w chief complaint of SOB and cough. States that she had SOB starting last night with wheezing and went to PMD this morning and was told her oxygen "was low", 88%.States that she took her albuterol pump at home but denies alleviation, associated with chest tightness but denies chest pain, dizziness, visual changes, n/v/d, lower extremity swelling, urinary symptoms, bloody stools.     On exam: patient without tachypnea on RA satting 98%, lungs with decreased breath sounds B/L but no appreciated crackles or rales. No LE edema or rash.     Likely COPD exacerbation over CHF exacerbation as pt exhibits no clinical signs of fluid overload at this time. No respiratory distress, not requiring oxygen supplementation, possible viral etiology with COPD exacerbation, will provide duoneb, steroids, obtain labs w/ pro-bnp, CXR, RVP and reassess response to medications. Dispo pending reassessment and clinical improvement.

## 2022-11-14 NOTE — ED PROVIDER NOTE - NSFOLLOWUPINSTRUCTIONS_ED_ALL_ED_FT
Please follow up with your primary care physician within 2-3 days.   Please follow up with your pulmonologist and cardiaolgist within1 week.   Return to the ER for any new or concerning symptoms shortness of breath, chest pain, vomiting, fever.     You may take 650 mg acetaminophen every eight hours as needed for pain.   You may take 600 mg Motrin every eight hours as needed for pain.   Drink plenty of fluids and rest.      Shortness of Breath, Adult      Shortness of breath is when a person has trouble breathing or when a person feels like she or he is having trouble breathing in enough air. Shortness of breath could be a sign of a medical problem.      Follow these instructions at home:  A sign showing that a person should not smoke.   Pollutants     • Do not use any products that contain nicotine or tobacco. These products include cigarettes, chewing tobacco, and vaping devices, such as e-cigarettes. This also includes cigars and pipes. If you need help quitting, ask your health care provider.    •Avoid things that can irritate your airways, including:  •Smoke. This includes campfire smoke, forest fire smoke, and secondhand smoke from tobacco products. Do not smoke or allow others to smoke in your home.      •Mold.      •Dust.      •Air pollution.      •Chemical fumes.      •Things that can give you an allergic reaction (allergens) if you have allergies. Common allergens include pollen from grasses or trees and animal dander.        •Keep your living space clean and free of mold and dust.      General instructions     •Pay attention to any changes in your symptoms.      •Take over-the-counter and prescription medicines only as told by your health care provider. This includes oxygen therapy and inhaled medicines.      •Rest as needed.      •Return to your normal activities as told by your health care provider. Ask your health care provider what activities are safe for you.      •Keep all follow-up visits. This is important.        Contact a health care provider if:    •Your condition does not improve as soon as expected.      •You have a hard time doing your normal activities, even after you rest.      •You have new symptoms.      •You cannot walk up stairs or exercise the way that you normally do.        Get help right away if:    •Your shortness of breath gets worse.      •You have shortness of breath when you are resting.      •You feel light-headed or you faint.      •You have a cough that is not controlled with medicines.      •You cough up blood.      •You have pain with breathing.      •You have pain in your chest, arms, shoulders, or abdomen.      •You have a fever.      These symptoms may be an emergency. Get help right away. Call 911.   • Do not wait to see if the symptoms will go away.       • Do not drive yourself to the hospital.         Summary    •Shortness of breath is when a person has trouble breathing enough air. It can be a sign of a medical problem.      •Avoid things that irritate your lungs, such as smoking, pollution, mold, and dust.      •Pay attention to changes in your symptoms and contact your health care provider if you have a hard time completing daily activities because of shortness of breath.      This information is not intended to replace advice given to you by your health care provider. Make sure you discuss any questions you have with your health care provider.

## 2022-11-14 NOTE — ED PROVIDER NOTE - OBJECTIVE STATEMENT
76 Y/o female HTN, COPD, CHF p/w chief complaint of SOB.   ROS + 78 Y/o female HTN, COPD, CHF, and A-fib on AC p/w chief complaint of SOB and cough. States that she had SOB starting last night with wheezing and went to PMD this morning. States that at her PMD her O2 was 88% so she was sent her. States that she took pump at home and it didn't help much. Patient states that she also had CP this morning that has since gone away. Is denying any other symptoms.   ROS +SOB/CP/Cough   Denies N/V/F/chills/abd pain/urinary symptoms

## 2022-11-14 NOTE — ED PROVIDER NOTE - CLINICAL SUMMARY MEDICAL DECISION MAKING FREE TEXT BOX
78 Y/o female HTN, COPD, CHF, and A-fib on AC p/w chief complaint of SOB and cough. Patient denies- N/V/F/chills/abd pain/urinary symptoms. Vitals - WNL at hospital. Physical exam - poor air entry. EKG.    Ddx: COPD vs CHF exacerbation vs ACS vs bronchitis vs URI vs pneumonia    Plan to get basic labs, cardiac labs, chest xray, treat with duonebs, steroids   Dispo - pending labs and imaging

## 2022-11-14 NOTE — ED PROVIDER NOTE - PATIENT PORTAL LINK FT
You can access the FollowMyHealth Patient Portal offered by [facility] by registering at the following website: [email or web address]. By joining [employer]’s FollowMyHealth portal, you will also be able to view your health information using other applications (apps) compatible with our system.

## 2022-11-14 NOTE — ED ADULT TRIAGE NOTE - CHIEF COMPLAINT QUOTE
Pt c/o cough and shortness of breath. denies cp, fever. O2 sat on RA 96%. respirations even and unlabored. pmhx: COPD, CHF, Afib on eliquis

## 2022-11-14 NOTE — ED PROVIDER NOTE - PHYSICAL EXAMINATION
GENERAL: Awake, alert, NAD  HEENT: NC/AT, moist mucous membranes, PERRL, EOMI  LUNGS: no wheezes or crackles. low air entry  CARDIAC: RRR, no m/r/g  ABDOMEN: Soft, non tender, non distended, no rebound, no guarding  BACK: No midline spinal tenderness, no CVA tenderness  EXT: No edema, no calf tenderness, 2+ DP pulses bilaterally, no deformities.  NEURO: A&Ox3. Moving all extremities.  SKIN: Warm and dry. No rash.  PSYCH: Normal affect.

## 2022-11-14 NOTE — ED ADULT NURSE NOTE - NSIMPLEMENTINTERV_GEN_ALL_ED
Implemented All Fall Risk Interventions:  [place] to call system. Call bell, personal items and telephone within reach. Instruct patient to call for assistance. Room bathroom lighting operational. Non-slip footwear when patient is off stretcher. Physically safe environment: no spills, clutter or unnecessary equipment. Stretcher in lowest position, wheels locked, appropriate side rails in place. Provide visual cue, wrist band, yellow gown, etc. Monitor gait and stability. Monitor for mental status changes and reorient to person, place, and time. Review medications for side effects contributing to fall risk. Reinforce activity limits and safety measures with patient and family.

## 2023-10-02 ENCOUNTER — INPATIENT (INPATIENT)
Facility: HOSPITAL | Age: 78
LOS: 13 days | Discharge: NURSING, REHAB OR LONG-TERM CARE | End: 2023-10-16
Attending: INTERNAL MEDICINE | Admitting: INTERNAL MEDICINE
Payer: MEDICARE

## 2023-10-02 VITALS
DIASTOLIC BLOOD PRESSURE: 107 MMHG | SYSTOLIC BLOOD PRESSURE: 169 MMHG | HEART RATE: 147 BPM | RESPIRATION RATE: 31 BRPM | OXYGEN SATURATION: 100 %

## 2023-10-02 DIAGNOSIS — I48.91 UNSPECIFIED ATRIAL FIBRILLATION: ICD-10-CM

## 2023-10-02 DIAGNOSIS — J96.01 ACUTE RESPIRATORY FAILURE WITH HYPOXIA: ICD-10-CM

## 2023-10-02 DIAGNOSIS — E78.5 HYPERLIPIDEMIA, UNSPECIFIED: ICD-10-CM

## 2023-10-02 DIAGNOSIS — R73.9 HYPERGLYCEMIA, UNSPECIFIED: ICD-10-CM

## 2023-10-02 DIAGNOSIS — I50.23 ACUTE ON CHRONIC SYSTOLIC (CONGESTIVE) HEART FAILURE: ICD-10-CM

## 2023-10-02 DIAGNOSIS — E66.01 MORBID (SEVERE) OBESITY DUE TO EXCESS CALORIES: ICD-10-CM

## 2023-10-02 DIAGNOSIS — I10 ESSENTIAL (PRIMARY) HYPERTENSION: ICD-10-CM

## 2023-10-02 LAB
ALBUMIN SERPL ELPH-MCNC: 3.3 G/DL — SIGNIFICANT CHANGE UP (ref 3.3–5)
ALP SERPL-CCNC: 126 U/L — HIGH (ref 40–120)
ALT FLD-CCNC: 142 U/L — HIGH (ref 12–78)
ANION GAP SERPL CALC-SCNC: 6 MMOL/L — SIGNIFICANT CHANGE UP (ref 5–17)
AST SERPL-CCNC: 147 U/L — HIGH (ref 15–37)
BASE EXCESS BLDA CALC-SCNC: 4.4 MMOL/L — HIGH (ref -2–3)
BASOPHILS # BLD AUTO: 0.02 K/UL — SIGNIFICANT CHANGE UP (ref 0–0.2)
BASOPHILS NFR BLD AUTO: 0.2 % — SIGNIFICANT CHANGE UP (ref 0–2)
BILIRUB SERPL-MCNC: 0.8 MG/DL — SIGNIFICANT CHANGE UP (ref 0.2–1.2)
BLOOD GAS COMMENTS ARTERIAL: SIGNIFICANT CHANGE UP
BUN SERPL-MCNC: 20 MG/DL — SIGNIFICANT CHANGE UP (ref 7–23)
CALCIUM SERPL-MCNC: 7.9 MG/DL — LOW (ref 8.5–10.1)
CHLORIDE SERPL-SCNC: 102 MMOL/L — SIGNIFICANT CHANGE UP (ref 96–108)
CK SERPL-CCNC: 333 U/L — HIGH (ref 26–192)
CO2 BLDA-SCNC: 35 MMOL/L — HIGH (ref 19–24)
CO2 SERPL-SCNC: 29 MMOL/L — SIGNIFICANT CHANGE UP (ref 22–31)
CREAT SERPL-MCNC: 1.27 MG/DL — SIGNIFICANT CHANGE UP (ref 0.5–1.3)
D DIMER BLD IA.RAPID-MCNC: 437 NG/ML DDU — HIGH
EGFR: 44 ML/MIN/1.73M2 — LOW
EOSINOPHIL # BLD AUTO: 0.12 K/UL — SIGNIFICANT CHANGE UP (ref 0–0.5)
EOSINOPHIL NFR BLD AUTO: 1.5 % — SIGNIFICANT CHANGE UP (ref 0–6)
FLUAV AG NPH QL: SIGNIFICANT CHANGE UP
FLUBV AG NPH QL: SIGNIFICANT CHANGE UP
GAS PNL BLDA: SIGNIFICANT CHANGE UP
GLUCOSE BLDC GLUCOMTR-MCNC: 152 MG/DL — HIGH (ref 70–99)
GLUCOSE BLDC GLUCOMTR-MCNC: 160 MG/DL — HIGH (ref 70–99)
GLUCOSE BLDC GLUCOMTR-MCNC: 185 MG/DL — HIGH (ref 70–99)
GLUCOSE SERPL-MCNC: 301 MG/DL — HIGH (ref 70–99)
HCO3 BLDA-SCNC: 33 MMOL/L — HIGH (ref 21–28)
HCT VFR BLD CALC: 38.3 % — SIGNIFICANT CHANGE UP (ref 34.5–45)
HGB BLD-MCNC: 11.4 G/DL — LOW (ref 11.5–15.5)
HOROWITZ INDEX BLDA+IHG-RTO: 100 — SIGNIFICANT CHANGE UP
IMM GRANULOCYTES NFR BLD AUTO: 0.4 % — SIGNIFICANT CHANGE UP (ref 0–0.9)
LYMPHOCYTES # BLD AUTO: 2.9 K/UL — SIGNIFICANT CHANGE UP (ref 1–3.3)
LYMPHOCYTES # BLD AUTO: 35.6 % — SIGNIFICANT CHANGE UP (ref 13–44)
MCHC RBC-ENTMCNC: 29.8 G/DL — LOW (ref 32–36)
MCHC RBC-ENTMCNC: 30.6 PG — SIGNIFICANT CHANGE UP (ref 27–34)
MCV RBC AUTO: 102.7 FL — HIGH (ref 80–100)
MONOCYTES # BLD AUTO: 0.74 K/UL — SIGNIFICANT CHANGE UP (ref 0–0.9)
MONOCYTES NFR BLD AUTO: 9.1 % — SIGNIFICANT CHANGE UP (ref 2–14)
NEUTROPHILS # BLD AUTO: 4.33 K/UL — SIGNIFICANT CHANGE UP (ref 1.8–7.4)
NEUTROPHILS NFR BLD AUTO: 53.2 % — SIGNIFICANT CHANGE UP (ref 43–77)
NRBC # BLD: 0 /100 WBCS — SIGNIFICANT CHANGE UP (ref 0–0)
NT-PROBNP SERPL-SCNC: 1765 PG/ML — HIGH (ref 0–450)
PCO2 BLDA: 67 MMHG — HIGH (ref 32–46)
PH BLDA: 7.3 — LOW (ref 7.35–7.45)
PLATELET # BLD AUTO: 283 K/UL — SIGNIFICANT CHANGE UP (ref 150–400)
PO2 BLDA: 165 MMHG — HIGH (ref 83–108)
POTASSIUM SERPL-MCNC: 4.5 MMOL/L — SIGNIFICANT CHANGE UP (ref 3.5–5.3)
POTASSIUM SERPL-SCNC: 4.5 MMOL/L — SIGNIFICANT CHANGE UP (ref 3.5–5.3)
PROT SERPL-MCNC: 7.7 GM/DL — SIGNIFICANT CHANGE UP (ref 6–8.3)
RBC # BLD: 3.73 M/UL — LOW (ref 3.8–5.2)
RBC # FLD: 13 % — SIGNIFICANT CHANGE UP (ref 10.3–14.5)
SAO2 % BLDA: 99.8 % — HIGH (ref 94–98)
SARS-COV-2 RNA SPEC QL NAA+PROBE: SIGNIFICANT CHANGE UP
SODIUM SERPL-SCNC: 137 MMOL/L — SIGNIFICANT CHANGE UP (ref 135–145)
TROPONIN I, HIGH SENSITIVITY RESULT: 21.5 NG/L — SIGNIFICANT CHANGE UP
WBC # BLD: 8.14 K/UL — SIGNIFICANT CHANGE UP (ref 3.8–10.5)
WBC # FLD AUTO: 8.14 K/UL — SIGNIFICANT CHANGE UP (ref 3.8–10.5)

## 2023-10-02 PROCEDURE — 76604 US EXAM CHEST: CPT | Mod: 26,59

## 2023-10-02 PROCEDURE — 99291 CRITICAL CARE FIRST HOUR: CPT

## 2023-10-02 PROCEDURE — 99223 1ST HOSP IP/OBS HIGH 75: CPT

## 2023-10-02 PROCEDURE — 93010 ELECTROCARDIOGRAM REPORT: CPT

## 2023-10-02 PROCEDURE — 71250 CT THORAX DX C-: CPT | Mod: 26

## 2023-10-02 PROCEDURE — 71045 X-RAY EXAM CHEST 1 VIEW: CPT | Mod: 26

## 2023-10-02 RX ORDER — SACUBITRIL AND VALSARTAN 24; 26 MG/1; MG/1
1 TABLET, FILM COATED ORAL
Refills: 0 | Status: DISCONTINUED | OUTPATIENT
Start: 2023-10-02 | End: 2023-10-16

## 2023-10-02 RX ORDER — LANOLIN ALCOHOL/MO/W.PET/CERES
3 CREAM (GRAM) TOPICAL AT BEDTIME
Refills: 0 | Status: DISCONTINUED | OUTPATIENT
Start: 2023-10-02 | End: 2023-10-16

## 2023-10-02 RX ORDER — FUROSEMIDE 40 MG
40 TABLET ORAL
Refills: 0 | Status: DISCONTINUED | OUTPATIENT
Start: 2023-10-02 | End: 2023-10-06

## 2023-10-02 RX ORDER — DEXTROSE 50 % IN WATER 50 %
15 SYRINGE (ML) INTRAVENOUS ONCE
Refills: 0 | Status: DISCONTINUED | OUTPATIENT
Start: 2023-10-02 | End: 2023-10-16

## 2023-10-02 RX ORDER — AZITHROMYCIN 500 MG/1
500 TABLET, FILM COATED ORAL EVERY 24 HOURS
Refills: 0 | Status: COMPLETED | OUTPATIENT
Start: 2023-10-02 | End: 2023-10-04

## 2023-10-02 RX ORDER — METOPROLOL TARTRATE 50 MG
100 TABLET ORAL
Refills: 0 | Status: DISCONTINUED | OUTPATIENT
Start: 2023-10-02 | End: 2023-10-16

## 2023-10-02 RX ORDER — SODIUM CHLORIDE 9 MG/ML
1000 INJECTION, SOLUTION INTRAVENOUS
Refills: 0 | Status: DISCONTINUED | OUTPATIENT
Start: 2023-10-02 | End: 2023-10-16

## 2023-10-02 RX ORDER — ATORVASTATIN CALCIUM 80 MG/1
20 TABLET, FILM COATED ORAL AT BEDTIME
Refills: 0 | Status: DISCONTINUED | OUTPATIENT
Start: 2023-10-02 | End: 2023-10-16

## 2023-10-02 RX ORDER — FUROSEMIDE 40 MG
80 TABLET ORAL ONCE
Refills: 0 | Status: COMPLETED | OUTPATIENT
Start: 2023-10-02 | End: 2023-10-02

## 2023-10-02 RX ORDER — CEFTRIAXONE 500 MG/1
1000 INJECTION, POWDER, FOR SOLUTION INTRAMUSCULAR; INTRAVENOUS EVERY 24 HOURS
Refills: 0 | Status: COMPLETED | OUTPATIENT
Start: 2023-10-02 | End: 2023-10-06

## 2023-10-02 RX ORDER — METOPROLOL TARTRATE 50 MG
5 TABLET ORAL ONCE
Refills: 0 | Status: COMPLETED | OUTPATIENT
Start: 2023-10-02 | End: 2023-10-02

## 2023-10-02 RX ORDER — APIXABAN 2.5 MG/1
5 TABLET, FILM COATED ORAL EVERY 12 HOURS
Refills: 0 | Status: DISCONTINUED | OUTPATIENT
Start: 2023-10-02 | End: 2023-10-16

## 2023-10-02 RX ORDER — ACETAMINOPHEN 500 MG
650 TABLET ORAL EVERY 6 HOURS
Refills: 0 | Status: DISCONTINUED | OUTPATIENT
Start: 2023-10-02 | End: 2023-10-16

## 2023-10-02 RX ORDER — DEXTROSE 50 % IN WATER 50 %
25 SYRINGE (ML) INTRAVENOUS ONCE
Refills: 0 | Status: DISCONTINUED | OUTPATIENT
Start: 2023-10-02 | End: 2023-10-16

## 2023-10-02 RX ORDER — AMLODIPINE BESYLATE 2.5 MG/1
1 TABLET ORAL
Qty: 0 | Refills: 0 | DISCHARGE

## 2023-10-02 RX ORDER — SACUBITRIL AND VALSARTAN 24; 26 MG/1; MG/1
1 TABLET, FILM COATED ORAL
Refills: 0 | DISCHARGE

## 2023-10-02 RX ORDER — DEXTROSE 50 % IN WATER 50 %
12.5 SYRINGE (ML) INTRAVENOUS ONCE
Refills: 0 | Status: DISCONTINUED | OUTPATIENT
Start: 2023-10-02 | End: 2023-10-16

## 2023-10-02 RX ORDER — IPRATROPIUM/ALBUTEROL SULFATE 18-103MCG
3 AEROSOL WITH ADAPTER (GRAM) INHALATION
Refills: 0 | Status: COMPLETED | OUTPATIENT
Start: 2023-10-02 | End: 2023-10-02

## 2023-10-02 RX ORDER — SPIRONOLACTONE 25 MG/1
25 TABLET, FILM COATED ORAL DAILY
Refills: 0 | Status: DISCONTINUED | OUTPATIENT
Start: 2023-10-02 | End: 2023-10-16

## 2023-10-02 RX ORDER — GLUCAGON INJECTION, SOLUTION 0.5 MG/.1ML
1 INJECTION, SOLUTION SUBCUTANEOUS ONCE
Refills: 0 | Status: DISCONTINUED | OUTPATIENT
Start: 2023-10-02 | End: 2023-10-16

## 2023-10-02 RX ORDER — POTASSIUM CHLORIDE 20 MEQ
1 PACKET (EA) ORAL
Qty: 0 | Refills: 0 | DISCHARGE

## 2023-10-02 RX ORDER — INSULIN LISPRO 100/ML
VIAL (ML) SUBCUTANEOUS
Refills: 0 | Status: DISCONTINUED | OUTPATIENT
Start: 2023-10-02 | End: 2023-10-16

## 2023-10-02 RX ORDER — INSULIN LISPRO 100/ML
VIAL (ML) SUBCUTANEOUS AT BEDTIME
Refills: 0 | Status: DISCONTINUED | OUTPATIENT
Start: 2023-10-02 | End: 2023-10-16

## 2023-10-02 RX ORDER — LISINOPRIL 2.5 MG/1
1 TABLET ORAL
Qty: 0 | Refills: 0 | DISCHARGE

## 2023-10-02 RX ORDER — IPRATROPIUM/ALBUTEROL SULFATE 18-103MCG
3 AEROSOL WITH ADAPTER (GRAM) INHALATION EVERY 6 HOURS
Refills: 0 | Status: DISCONTINUED | OUTPATIENT
Start: 2023-10-02 | End: 2023-10-04

## 2023-10-02 RX ORDER — INFLUENZA VIRUS VACCINE 15; 15; 15; 15 UG/.5ML; UG/.5ML; UG/.5ML; UG/.5ML
0.7 SUSPENSION INTRAMUSCULAR ONCE
Refills: 0 | Status: COMPLETED | OUTPATIENT
Start: 2023-10-02 | End: 2023-10-02

## 2023-10-02 RX ADMIN — Medication 5 MILLIGRAM(S): at 10:34

## 2023-10-02 RX ADMIN — CEFTRIAXONE 100 MILLIGRAM(S): 500 INJECTION, POWDER, FOR SOLUTION INTRAMUSCULAR; INTRAVENOUS at 13:56

## 2023-10-02 RX ADMIN — Medication 1: at 17:33

## 2023-10-02 RX ADMIN — Medication 5 MILLIGRAM(S): at 06:48

## 2023-10-02 RX ADMIN — APIXABAN 5 MILLIGRAM(S): 2.5 TABLET, FILM COATED ORAL at 17:33

## 2023-10-02 RX ADMIN — ATORVASTATIN CALCIUM 20 MILLIGRAM(S): 80 TABLET, FILM COATED ORAL at 23:26

## 2023-10-02 RX ADMIN — Medication 100 MILLIGRAM(S): at 17:33

## 2023-10-02 RX ADMIN — Medication 3 MILLILITER(S): at 06:48

## 2023-10-02 RX ADMIN — Medication 3 MILLILITER(S): at 07:03

## 2023-10-02 RX ADMIN — Medication 100 MILLIGRAM(S): at 13:08

## 2023-10-02 RX ADMIN — AZITHROMYCIN 255 MILLIGRAM(S): 500 TABLET, FILM COATED ORAL at 13:09

## 2023-10-02 RX ADMIN — Medication 125 MILLIGRAM(S): at 06:37

## 2023-10-02 RX ADMIN — SACUBITRIL AND VALSARTAN 1 TABLET(S): 24; 26 TABLET, FILM COATED ORAL at 17:33

## 2023-10-02 RX ADMIN — SPIRONOLACTONE 25 MILLIGRAM(S): 25 TABLET, FILM COATED ORAL at 13:08

## 2023-10-02 RX ADMIN — Medication 3 MILLILITER(S): at 06:54

## 2023-10-02 RX ADMIN — Medication 40 MILLIGRAM(S): at 17:32

## 2023-10-02 RX ADMIN — Medication 80 MILLIGRAM(S): at 06:46

## 2023-10-02 NOTE — CHART NOTE - NSCHARTNOTEFT_GEN_A_CORE
:  [address]IN [address]   Indication:  hypoxemia     PROCEDURE:  [ ] LIMITED ECHO  [x ] LIMITED CHEST  [ ] LIMITED RETROPERITONEAL  [ ] LIMITED ABDOMINAL  [ ] LIMITED DVT  [ ] NEEDLE GUIDANCE VASCULAR  [ ] NEEDLE GUIDANCE THORACENTESIS  [ ] NEEDLE GUIDANCE PARACENTESIS  [ ] NEEDLE GUIDANCE PERICARDIOCENTESIS  [ ] OTHER    FINDINGS:  Chest: A-line predominant, normal aeration pattern bilat anteriorly with bilat lower lobe consolidations without effusions       INTERPRETATION:  lung exam consistent with bibasilar consolidations c/f atelectasis vs pneumonia  no evidence of pulmonary edema on US      images uploaded to [employer] :  ELVIN [address]   Indication:  hypoxemia     PROCEDURE:  [ ] LIMITED ECHO  [x ] LIMITED CHEST  [ ] LIMITED RETROPERITONEAL  [ ] LIMITED ABDOMINAL  [ ] LIMITED DVT  [ ] NEEDLE GUIDANCE VASCULAR  [ ] NEEDLE GUIDANCE THORACENTESIS  [ ] NEEDLE GUIDANCE PARACENTESIS  [ ] NEEDLE GUIDANCE PERICARDIOCENTESIS  [ ] OTHER    FINDINGS:  Chest: A-line predominant, normal aeration pattern bilat anteriorly with bilat lower lobe consolidations without effusions       INTERPRETATION:  lung exam consistent with bibasilar consolidations c/f atelectasis vs pneumonia  no evidence of pulmonary edema on US      images uploaded to [employer]    ATTENDING ATTESTATION - I was present for entirety of exam

## 2023-10-02 NOTE — H&P ADULT - NSHPPHYSICALEXAM_GEN_ALL_CORE
CONSTITUTIONAL: alert and cooperative, mild respiratory distress  EYES: PERRL, no scleral icterus  ENT: Mucosa moist, tongue normal  NECK: Neck supple, trachea midline, non-tender  CARDIAC: Afib, trace Pedal edema. Chronic LE skin changes  LUNGS: Equal air entry both lungs. Rales, upper airway wheezing, slight basilar crackles +. Increase respiratory effort.   ABDOMEN: Soft, nondistended, nontender. No guarding or rebound tenderness. No hepatomegaly or splenomegaly. Bowel sound normal.  MUSCULOSKELETAL: Normocephalic, atraumatic. No significant deformity or joint abnormality  NEUROLOGICAL: No gross motor or sensory deficits  PSYCHIATRIC: A&O x 3, appropriate mood and affect

## 2023-10-02 NOTE — CONSULT NOTE ADULT - NS ATTEND AMEND GEN_ALL_CORE FT
77F w/ HTN, ?COPD, CHF, Afib, LEIDY on CPAP. Presents w/ SOB and cough. Pt placed on BiPAP in ED, given steroids and diuretics. ABG showed mild acute on chronic hypercapnia and hypoxemia. POCUS performed at bedside revealed A line predominance w/ bibasilar consolidation patterns concerned for possible pneumonia vs pulmonary edema    - transitioned by us off BiPAP to green NC, satting high 90s in 7L  - pt received IV diuretics prior to exam, may have had some degree of pulmonary edema; can continue IV diuretics  - recommend course of abx to cover for CAP given consolidation pattern on POCUS  - unclear hx of COPD vs asthma - pt never smoked, may have asthma not COPD, but no wheezing so will hold off on steroids; med rec so can start home inhalers if she is on any; duonebs for now  - CPAP nightly, 7mmH2O are pt's settings  - will continue to follow

## 2023-10-02 NOTE — ED ADULT NURSE NOTE - OBJECTIVE STATEMENT
Pt brought in by EMS on BIPAP c/o of SOB and difficulty breathing x1 hour. BIBA,  difficulty breathing x 1 hour.   12mg Dexamethasone  and duonebx1 administered by  EMS in field. Per EMS  pt was on 88% R/a, +wheezing at home but improved when placed on BIPAP. Pt reports fever, and cough but denies sick contact.   PMH COPD, HF, AFIB asthma and  previous Intubation.  Pt currently taking anticoagulants but cannot remember the name. MD Gaston at bedside to assess, pt placed on cardiac monitor and BIPAP in ED. Pt brought in by EMS on BIPAP c/o of SOB and difficulty breathing x1 hour. BIBA,  difficulty breathing x 1 hour.   12mg Dexamethasone  and duonebx1 administered by  EMS in field. Per EMS  pt was on 88% R/a, +wheezing at home but improved when placed on BIPAP. Pt reports fever, and cough x1 days but denies sick contact.   Pt reports taking breathing treatment at home with CPAP yesterday with symptoms resolving. PMH COPD, HF, AFIB and asthma .  Pt currently taking anticoagulants but cannot remember the name. MD Gaston at bedside to assess, pt placed on cardiac monitor and BIPAP in ED.

## 2023-10-02 NOTE — PATIENT PROFILE ADULT - FALL HARM RISK - RISK INTERVENTIONS

## 2023-10-02 NOTE — PHARMACOTHERAPY INTERVENTION NOTE - COMMENTS
Per pharmacy policy for dose optimization on antimicrobials, adjusted duration of azithromycin 500mg IVPB for CAP to be 3 days instead of 7 days.

## 2023-10-02 NOTE — H&P ADULT - PROBLEM SELECTOR PLAN 1
Hypoxic to 88% at RA with respiratory distress required BiPAP support  CXR with pul congestion. Superimposed airspace opacities in lung bases  Possible have underlying component of OHS  elevated BNP. ddimer in 400s  ( normal for age corrected value)  Received IV lasix 80mg in ED with improvement  IV lasix 40mg bid  Wean of BiPAP as tolerate  ECHO  Ceftriaxone, azithromycin for possible underling pneumonia   check respiratory PCR, urine legionella Ag, strep pneumo Ag, mycoplasma IgM, CT chest  Pul consulted

## 2023-10-02 NOTE — H&P ADULT - ASSESSMENT
78 years old female with h/o HTN, HLD, CHF, Afib, obesity, asthma, LEIDY present to ED with complain of SOB. Patient reported SOB for 1 day, associated with orthopnea. Patient also feel chest congestion with cough for 1 day. No fever, chills, flu like symptoms, nausea, vomiting or diarrhea. Patient reproted missing diuretic for last few days.   Noted to be in Afib with RVR. Hypoxic to 88% at RA with respiratory distress required BiPAP support. No leukocytosis, plt 283, glucose 301, hsTnT 21.5, proBNP 1765, Flu/COVID negative. CXR with pul congestion. Superimposed airspace opacities in lung bases.

## 2023-10-02 NOTE — ED ADULT TRIAGE NOTE - CHIEF COMPLAINT QUOTE
BIBA,  difficulty breathing x 1 hour.  pt on Bipap by EMS.  12mg Dexamethasone IM given,  duonebx1  per EMS, pt was on 88% R/a, +wheezing at home.  PMH-COPD, ASTHMA, previous Intubation.  [id number]

## 2023-10-02 NOTE — H&P ADULT - HISTORY OF PRESENT ILLNESS
78 years old female with h/o HTN, HLD, CHF, Afib, obesity, asthma, LEIDY present to ED with complain of SOB. Patient reported SOB for 1 day, associated with orthopnea. Patient also feel chest congestion with cough for 1 day. No fever, chills, flu like symptoms, nausea, vomiting or diarrhea. Patient reproted missing diuretic for last few days.   Noted to be in Afib with RVR. Hypoxic to 88% at RA with respiratory distress required BiPAP support. No leukocytosis, plt 283, glucose 301, hsTnT 21.5, proBNP 1765, Flu/COVID negative. CXR with pul congestion. Superimposed airspace opacities in lung bases.    SH: no toxic habits

## 2023-10-02 NOTE — ED PROVIDER NOTE - CLINICAL SUMMARY MEDICAL DECISION MAKING FREE TEXT BOX
SOB and hypoxia hx of COPD diffuse wheezing on exam placed on BiPAP in the ER, given treatment in the field for COPD with steroids.  Symptoms likely due to COPD exacerbation however strongly consider CHF exacerbation, ACS and PE.  Will check labs, CXR and Admit.

## 2023-10-02 NOTE — PATIENT PROFILE ADULT - FUNCTIONAL ASSESSMENT - DAILY ACTIVITY 6.
No-Patient/Caregiver offered and refused free interpretation services. 4 = No assist / stand by assistance

## 2023-10-02 NOTE — H&P ADULT - PROBLEM SELECTOR PLAN 2
Afib with RVR, VR 160s  IV lopressor 5mg  Resume metoprolol 100mg bid  Monitor HR and titrate BB  TSH, free thyroxine  ECHO, telemetry monitoring  Continue apxiaban 5mg bid

## 2023-10-02 NOTE — ED PROVIDER NOTE - OBJECTIVE STATEMENT
This patient is a 78 year old woman hx of COPD and CHF BIBA in respiratory distress.  EMS reports patient hypoxic in the field to 88% room air.  She was given dexamethasone 12mg IM, a combi treatment and placed on CPAP.  Patient c/o 1 week of SOB that became acutely worse tonight.  She states that she has been using her inhaler and nebulizer at home without relief.  She reports chest tightness.  She denies sick contacts, recent travel and fever.

## 2023-10-02 NOTE — H&P ADULT - PROBLEM SELECTOR PLAN 3
EF 40% in 2019  Missed lasix for a few days  IV lasix 40mg bid, possible decrease dose tomorrow if respiratory status improve  ECHO, telemetry monitoring  Monitor I/O, closely monitor serum electrolytes, daily weight  on statin, entresto, BB, aldactone, lasix

## 2023-10-03 LAB
A1C WITH ESTIMATED AVERAGE GLUCOSE RESULT: 5.6 % — SIGNIFICANT CHANGE UP (ref 4–5.6)
ALBUMIN SERPL ELPH-MCNC: 3.3 G/DL — SIGNIFICANT CHANGE UP (ref 3.3–5)
ALP SERPL-CCNC: 106 U/L — SIGNIFICANT CHANGE UP (ref 40–120)
ALT FLD-CCNC: 121 U/L — HIGH (ref 12–78)
ANION GAP SERPL CALC-SCNC: 4 MMOL/L — LOW (ref 5–17)
AST SERPL-CCNC: 58 U/L — HIGH (ref 15–37)
BILIRUB SERPL-MCNC: 0.4 MG/DL — SIGNIFICANT CHANGE UP (ref 0.2–1.2)
BUN SERPL-MCNC: 28 MG/DL — HIGH (ref 7–23)
CALCIUM SERPL-MCNC: 8 MG/DL — LOW (ref 8.5–10.1)
CHLORIDE SERPL-SCNC: 103 MMOL/L — SIGNIFICANT CHANGE UP (ref 96–108)
CHOLEST SERPL-MCNC: 158 MG/DL — SIGNIFICANT CHANGE UP
CO2 SERPL-SCNC: 32 MMOL/L — HIGH (ref 22–31)
CREAT SERPL-MCNC: 1.1 MG/DL — SIGNIFICANT CHANGE UP (ref 0.5–1.3)
EGFR: 52 ML/MIN/1.73M2 — LOW
ESTIMATED AVERAGE GLUCOSE: 114 MG/DL — SIGNIFICANT CHANGE UP (ref 68–114)
GLUCOSE BLDC GLUCOMTR-MCNC: 117 MG/DL — HIGH (ref 70–99)
GLUCOSE BLDC GLUCOMTR-MCNC: 120 MG/DL — HIGH (ref 70–99)
GLUCOSE BLDC GLUCOMTR-MCNC: 122 MG/DL — HIGH (ref 70–99)
GLUCOSE BLDC GLUCOMTR-MCNC: 140 MG/DL — HIGH (ref 70–99)
GLUCOSE SERPL-MCNC: 120 MG/DL — HIGH (ref 70–99)
HCT VFR BLD CALC: 35.3 % — SIGNIFICANT CHANGE UP (ref 34.5–45)
HCV AB S/CO SERPL IA: 0.11 S/CO — SIGNIFICANT CHANGE UP (ref 0–0.99)
HCV AB SERPL-IMP: SIGNIFICANT CHANGE UP
HDLC SERPL-MCNC: 51 MG/DL — SIGNIFICANT CHANGE UP
HGB BLD-MCNC: 10.8 G/DL — LOW (ref 11.5–15.5)
LIPID PNL WITH DIRECT LDL SERPL: 92 MG/DL — SIGNIFICANT CHANGE UP
MAGNESIUM SERPL-MCNC: 2.4 MG/DL — SIGNIFICANT CHANGE UP (ref 1.6–2.6)
MCHC RBC-ENTMCNC: 30.6 G/DL — LOW (ref 32–36)
MCHC RBC-ENTMCNC: 31.7 PG — SIGNIFICANT CHANGE UP (ref 27–34)
MCV RBC AUTO: 103.5 FL — HIGH (ref 80–100)
NON HDL CHOLESTEROL: 107 MG/DL — SIGNIFICANT CHANGE UP
NRBC # BLD: 0 /100 WBCS — SIGNIFICANT CHANGE UP (ref 0–0)
PHOSPHATE SERPL-MCNC: 4.2 MG/DL — SIGNIFICANT CHANGE UP (ref 2.5–4.5)
PLATELET # BLD AUTO: 257 K/UL — SIGNIFICANT CHANGE UP (ref 150–400)
POTASSIUM SERPL-MCNC: 4.2 MMOL/L — SIGNIFICANT CHANGE UP (ref 3.5–5.3)
POTASSIUM SERPL-SCNC: 4.2 MMOL/L — SIGNIFICANT CHANGE UP (ref 3.5–5.3)
PROT SERPL-MCNC: 7.4 GM/DL — SIGNIFICANT CHANGE UP (ref 6–8.3)
RBC # BLD: 3.41 M/UL — LOW (ref 3.8–5.2)
RBC # FLD: 12.6 % — SIGNIFICANT CHANGE UP (ref 10.3–14.5)
SODIUM SERPL-SCNC: 139 MMOL/L — SIGNIFICANT CHANGE UP (ref 135–145)
T4 FREE SERPL-MCNC: 1.2 NG/DL — SIGNIFICANT CHANGE UP (ref 0.9–1.8)
TRIGL SERPL-MCNC: 83 MG/DL — SIGNIFICANT CHANGE UP
TSH SERPL-MCNC: 0.13 UIU/ML — LOW (ref 0.36–3.74)
WBC # BLD: 15.25 K/UL — HIGH (ref 3.8–10.5)
WBC # FLD AUTO: 15.25 K/UL — HIGH (ref 3.8–10.5)

## 2023-10-03 PROCEDURE — 93306 TTE W/DOPPLER COMPLETE: CPT | Mod: 26

## 2023-10-03 PROCEDURE — 99232 SBSQ HOSP IP/OBS MODERATE 35: CPT

## 2023-10-03 PROCEDURE — 99222 1ST HOSP IP/OBS MODERATE 55: CPT

## 2023-10-03 PROCEDURE — 99233 SBSQ HOSP IP/OBS HIGH 50: CPT

## 2023-10-03 RX ADMIN — Medication 40 MILLIGRAM(S): at 05:31

## 2023-10-03 RX ADMIN — Medication 100 MILLIGRAM(S): at 05:31

## 2023-10-03 RX ADMIN — Medication 3 MILLILITER(S): at 11:30

## 2023-10-03 RX ADMIN — CEFTRIAXONE 100 MILLIGRAM(S): 500 INJECTION, POWDER, FOR SOLUTION INTRAMUSCULAR; INTRAVENOUS at 11:20

## 2023-10-03 RX ADMIN — ATORVASTATIN CALCIUM 20 MILLIGRAM(S): 80 TABLET, FILM COATED ORAL at 23:04

## 2023-10-03 RX ADMIN — SACUBITRIL AND VALSARTAN 1 TABLET(S): 24; 26 TABLET, FILM COATED ORAL at 18:26

## 2023-10-03 RX ADMIN — SACUBITRIL AND VALSARTAN 1 TABLET(S): 24; 26 TABLET, FILM COATED ORAL at 05:30

## 2023-10-03 RX ADMIN — Medication 100 MILLIGRAM(S): at 18:26

## 2023-10-03 RX ADMIN — APIXABAN 5 MILLIGRAM(S): 2.5 TABLET, FILM COATED ORAL at 18:27

## 2023-10-03 RX ADMIN — AZITHROMYCIN 255 MILLIGRAM(S): 500 TABLET, FILM COATED ORAL at 11:20

## 2023-10-03 RX ADMIN — APIXABAN 5 MILLIGRAM(S): 2.5 TABLET, FILM COATED ORAL at 05:31

## 2023-10-03 RX ADMIN — Medication 40 MILLIGRAM(S): at 13:50

## 2023-10-03 RX ADMIN — SPIRONOLACTONE 25 MILLIGRAM(S): 25 TABLET, FILM COATED ORAL at 05:31

## 2023-10-03 NOTE — PROGRESS NOTE ADULT - ASSESSMENT
78 years old female with history of HTN, HLD, CHF, A. fib, Morbid obesity, COPD, LEIDY presented w/ dyspnea due to acute respiratory failure with hypoxia and hypercapnia due to acute on chronic systolic congestive heart failure and CAP. Pt was also found to be in Afib with RVR. Pt required BiPAP.      Acute on chronic systolic congestive heart failure    - CXR with pul congestion   - CT chest showed ground-glass opacities likely due to pulmonary edema in the setting of mild septal thickening, small pleural effusions and cardiomegaly. mosaic lung parenchyma with suspected air trapping.  - patient says she missed Lasix for a few days  - c/w IV Lasix 40mg bid  - ECHO showed EF 45-50%, moderate tricuspid regurgitation with moderate pulmonary hypertension  - c/w telemetry    - Monitor I/O & daily weight  - c/w Entresto, metoprolol, aldactone & Lipitor  -c/w BiPAP QHS    CAP  - c/w Ceftriaxone, azithromycin for possible underling pneumonia - will rescan chest once he's diuresed and follow up check respiratory PCR, urine legionella Ag, strep pneumo Ag, mycoplasma IgM, CT chest   - check full RVP and sputum cx  - Pul note read and appreciated   - c/w Duoneb    Atrial fibrillation with rapid ventricular response   - rate controlled w/ metoprolol    - ECHO showed EF 45-50%, moderate tricuspid regurgitation with moderate pulmonary hypertension  - c/w telemetry monitoring  - c/w Apixaban       Hyperglycemia  - c/w ISS and hypoglycemia protocol  - A1C is 5.6    Euthyroid sick syndrome  - TSH is 0.133, free thyroxine is 1.2     Ill-defined 4 mm nodule at the left apex   - will need to get follow up scan once patient improves    HTN  - c/w metoprolol     Hyperlipidemia  - c/w Lipitor    Prophylaxis:  GI: PO diet  DVT: Apixaban

## 2023-10-03 NOTE — PROGRESS NOTE ADULT - ASSESSMENT
78 YO female with PMH HTN, ?COPD, CHF, afib, LEIDY on CPAP 7 nightly, presents for SOB and cough, admitted for acute hypoxic and hypercapnic respiratory failure.      Dx: hypoxic/hypercapneic respiratory failure 2/2 viral +/- bacterial pna vs CHF exacerbation vs COPD exacerbation?    - satting well on 6L NC, continue to wean to maintain sats >90%  - no wheezing noted on exam  - CXR with pulm edema + RLL opacity with air bronchograms   - lung US 10/2 with A lines bilat with bibasilar consolidations   - hypoxic respiratory failure likely 2/2 pneumonia given consolidations on POCUS, continue CAP coverage  - possible pulmonary edema, can continue diuresis   - check full RVP, legionella, sputum culture (if able)  - mildly acutely hypercapnic as well, however likely with component of chronic 2/2 underlying LEIDY, continue nightly NIV use      Seen and DW attending Doris

## 2023-10-03 NOTE — CONSULT NOTE ADULT - ASSESSMENT
76 Y/o female HTN, COPD, CHF, and A-fib on AC p/w chief complaint of SOB and cough. Admitted for HRF requiring Bipap. Pulm consulted for further management.    Dx: hypoxic respiratory failure 2/2 viral +/- bacterial pna vs CHF exacerbation vs COPD exacerbation    -  Pt satting and ventilating well with MV 6-8L/min on Bipap 14/7/40%. Pt taken off bipap and placed on 8LNC satting 100%.  -wean NC for sat>92% and cont home CPAP at night ( reports using 7cm)  - CXR with pulm edema + RLL opacity with air bronchograms   -ABG 7.3/67/165 on 60%  - lung US with A lines bilat with bibasilar consolidations   - hypoxic respiratory failure likely from viral +/- bacterial pna. would treat for CAP and send full RVP and sputum cx  -pt with upper airway wheeze but not heard in the lung fields. pt not hypercapnic either. lower suspicion for COPD exacerbation. would hold off on steroids for now   - cont duonebs for airways clearance   - no B lines appreciated on lung US but no objection to diuresis. rec cards consult     d/w dr narayanan and primary team   
79yo lady with a PMH of HTN, HLD, chronic moderate systolic CHF since 2019, Afib, obesity, asthma, LEIDY; presented to the ED with SOB, associated with orthopnea and chest congestion.  Noted to be in Afib with RVR.   Hypoxic to 88% at RA with respiratory distress required BiPAP support.   hsTnT 21.5, proBNP 1765  Flu/COVID negative  CXR with pul congestion. Superimposed airspace opacities in lung bases.  EKG:  afib w/ RVR 161bpm  Tele: afib 80-90s  Admitted with pneumonia; on abx.    -cont supportive care  -cont abx.. on ceftriaxone and azithro  -currently rate controlled on metoprolol  -on eliquis for AC for afib  -cont home atorva/emtresto/spironolactone    No further episodes of RVR other than on admission.... currently afib 80-90s.  Will sign off for now; please call back with any further questions.

## 2023-10-03 NOTE — CONSULT NOTE ADULT - REASON FOR ADMISSION
acute respiratory failure with hypoxia/hypercapnia, CHF exacerbation, Afib with RVR
copd/CHF exacerbation

## 2023-10-03 NOTE — CONSULT NOTE ADULT - SUBJECTIVE AND OBJECTIVE BOX
CARDIOLOGY CONSULT NOTE    Patient is a 78y Female with a known history of :  Acute respiratory failure with hypoxia and hypercapnia [J96.01]    Atrial fibrillation with rapid ventricular response [I48.91]    Acute on chronic systolic congestive heart failure [I50.23]    Benign essential HTN [I10]    Hyperlipidemia [E78.5]    Morbid obesity [E66.01]    Hyperglycemia [R73.9]      HPI:  79yo lady with a PMH of HTN, HLD, chronic moderate systolic CHF since 2019, Afib, obesity, asthma, LEIDY; presented to the ED with SOB, associated with orthopnea and chest congestion.  Noted to be in Afib with RVR.   Hypoxic to 88% at RA with respiratory distress required BiPAP support.   hsTnT 21.5, proBNP 1765  Flu/COVID negative  CXR with pul congestion. Superimposed airspace opacities in lung bases.  EKG:  afib w/ RVR 161bpm  Tele: afib 80-90s  Admitted with pneumonia; on abx.      REVIEW OF SYSTEMS:  CONSTITUTIONAL: No fever, weight loss, or fatigue  EYES: No eye pain, visual disturbances, or discharge  ENMT:  No difficulty hearing, tinnitus, vertigo; No sinus or throat pain  NECK: No pain or stiffness  BREASTS: No pain, masses, or nipple discharge  RESPIRATORY: No cough, wheezing, chills or hemoptysis; + shortness of breath  CARDIOVASCULAR: No chest pain, palpitations, dizziness, or leg swelling  GASTROINTESTINAL: No abdominal or epigastric pain. No nausea, vomiting, or hematemesis; No diarrhea or constipation. No melena or hematochezia.  GENITOURINARY: No dysuria, frequency, hematuria, or incontinence  NEUROLOGICAL: No headaches, memory loss, loss of strength, numbness, or tremors  SKIN: No itching, burning, rashes, or lesions   LYMPH NODES: No enlarged glands  ENDOCRINE: No heat or cold intolerance; No hair loss  MUSCULOSKELETAL: No joint pain or swelling; No muscle, back, or extremity pain  PSYCHIATRIC: No depression, anxiety, mood swings, or difficulty sleeping  HEME/LYMPH: No easy bruising, or bleeding gums  ALLERGY AND IMMUNOLOGIC: No hives or eczema    MEDICATIONS  (STANDING):  apixaban 5 milliGRAM(s) Oral every 12 hours  atorvastatin 20 milliGRAM(s) Oral at bedtime  azithromycin  IVPB 500 milliGRAM(s) IV Intermittent every 24 hours  cefTRIAXone   IVPB 1000 milliGRAM(s) IV Intermittent every 24 hours  furosemide   Injectable 40 milliGRAM(s) IV Push two times a day  glucagon  Injectable 1 milliGRAM(s) IntraMuscular once  influenza  Vaccine (HIGH DOSE) 0.7 milliLiter(s) IntraMuscular once  insulin lispro (ADMELOG) corrective regimen sliding scale   SubCutaneous three times a day before meals  insulin lispro (ADMELOG) corrective regimen sliding scale   SubCutaneous at bedtime  metoprolol tartrate 100 milliGRAM(s) Oral two times a day  sacubitril 49 mG/valsartan 51 mG 1 Tablet(s) Oral two times a day  spironolactone 25 milliGRAM(s) Oral daily    MEDICATIONS  (PRN):  acetaminophen     Tablet .. 650 milliGRAM(s) Oral every 6 hours PRN Mild Pain (1 - 3), Moderate Pain (4 - 6)  albuterol/ipratropium for Nebulization 3 milliLiter(s) Nebulizer every 6 hours PRN Shortness of Breath and/or Wheezing  dextrose Oral Gel 15 Gram(s) Oral once PRN Blood Glucose LESS THAN 70 milliGRAM(s)/deciliter  melatonin 3 milliGRAM(s) Oral at bedtime PRN Insomnia      ALLERGIES: No Known Allergies      FAMILY HISTORY:  No pertinent family history in first degree relatives        PHYSICAL EXAMINATION:  -----------------------------  T(C): 36.7 (10-03-23 @ 11:19), Max: 36.7 (10-02-23 @ 20:08)  HR: 83 (10-03-23 @ 11:19) (75 - 116)  BP: 109/71 (10-03-23 @ 11:19) (109/71 - 147/81)  RR: 18 (10-03-23 @ 11:19) (16 - 23)  SpO2: 94% (10-03-23 @ 11:19) (94% - 100%)    Constitutional: well developed, normal appearance, well groomed, well nourished, no deformities and no acute distress.   Eyes: the conjunctiva exhibited no abnormalities and the eyelids demonstrated no xanthelasmas.   HEENT: normal oral mucosa, no oral pallor and no oral cyanosis.   Neck: normal jugular venous A waves present, normal jugular venous V waves present and no jugular venous anne A waves.   Pulmonary: no respiratory distress, normal respiratory rhythm and effort, no accessory muscle use and lungs were clear to auscultation bilaterally.   Cardiovascular: heart rate and rhythm were normal, normal S1 and S2 and no murmur, gallop, rub, heave or thrill are present.   Abdomen: soft, non-tender, no hepato-splenomegaly and no abdominal mass palpated.   Musculoskeletal: the gait could not be assessed..   Extremities: no clubbing of the fingernails, no localized cyanosis, no petechial hemorrhages and no ischemic changes.   Skin: normal skin color and pigmentation, no rash, no venous stasis, no skin lesions, no skin ulcer and no xanthoma was observed.   Psychiatric: oriented to person, place, and time, the affect was normal, the mood was normal and not feeling anxious.       LABS:   --------  10-03    139  |  103  |  28<H>  ----------------------------<  120<H>  4.2   |  32<H>  |  1.10    Ca    8.0<L>      03 Oct 2023 07:00  Phos  4.2     10-03  Mg     2.4     10-03    TPro  7.4  /  Alb  3.3  /  TBili  0.4  /  DBili  x   /  AST  58<H>  /  ALT  121<H>  /  AlkPhos  106  10-03                         10.8   15.25 )-----------( 257      ( 03 Oct 2023 07:00 )             35.3           158 mg/dL, --, 51 mg/dL, 83 mg/dL        RADIOLOGY:  -----------------    < from: Transthoracic Echocardiogram (03.23.19 @ 07:42) >  CONCLUSIONS:  1. Mitral annular calcification, otherwise normal mitral  valve. Mild mitral regurgitation.  2. Moderately dilated left atrium.  LA volume index = 45  cc/m2.  3. Normal left ventricular internal dimensions and wall  thicknesses.  4. Endocardium not well visualized; grossly moderate global  left ventricular systolic dysfunction.  5. The right ventricle is not well visualized; grossly  normal right ventricular systolic function.  ------------------------------------------------------------------------  Confirmed on  3/23/2019 - 08:59:57 by Melo Reyes M.D.    < end of copied text >  
CHIEF COMPLAINT: SOB    HPI:   76 Y/o female HTN, COPD, CHF, and A-fib on AC p/w chief complaint of SOB and cough. Pt states that since saturday she had sudden onset of cough with white phlegm and SOB that has gotten worse today and so she called EMS. Pt denies any other URI symptoms, n/v, fevers/chills but endorses some wheezing at home. Pt states she takes albuterol and a "purple inhaler" prn but nothing standing. She reports being compliant with her inhalers and diuretic. Denies sick contacts. In the ED pt was SOB and hypoxemic, was placed on bipap, and given IV steroids, duonebs and diuretics. Admitted for CHF/COPD exacerbation and admitted to tele. Pulm consulted for hypoxic resp failure and bipap.     Subjective: Pt seen and examined at the bedside. Pt satting 100% and ventilating well with MV 6-8L/min on Bipap 14/7/40%. Pt taken off bipap and placed on 8LNC satting 100%.  Pt endorses feeling much better since being admitted with improvement in her SOB but with persistent cough having difficulty "getting the mucous up"  Pt sees a pulmonologist for her COPD but forgers their name. She endorses compliance with her home CPAP as well.     PAST MEDICAL & SURGICAL HISTORY:  COPD (chronic obstructive pulmonary disease)      HTN (hypertension)      CHF (congestive heart failure)      Morbid obesity      Atrial fibrillation      Sleep apnea  (on CPAP, unknown home settings)      No significant past surgical history          FAMILY HISTORY:  No pertinent family history in first degree relatives        SOCIAL HISTORY:  Smoking: prior. denies current smoking   EtOH Use: denies   Recent Travel: denies       Allergies    No Known Allergies    Intolerances        HOME MEDICATIONS:    REVIEW OF SYSTEMS:    [x ] All other systems negative      OBJECTIVE:  ICU Vital Signs Last 24 Hrs  T(C): 36.7 (02 Oct 2023 08:25), Max: 36.7 (02 Oct 2023 08:25)  T(F): 98 (02 Oct 2023 08:25), Max: 98 (02 Oct 2023 08:25)  HR: 119 (02 Oct 2023 11:00) (91 - 153)  BP: 125/88 (02 Oct 2023 10:34) (124/75 - 169/107)  BP(mean): 100 (02 Oct 2023 10:34) (84 - 100)  ABP: --  ABP(mean): --  RR: 18 (02 Oct 2023 08:25) (18 - 31)  SpO2: 99% (02 Oct 2023 11:00) (95% - 100%)    O2 Parameters below as of 02 Oct 2023 08:25  Patient On (Oxygen Delivery Method): BiPAP/CPAP    CAPILLARY BLOOD GLUCOSE      POCT Blood Glucose.: 279 mg/dL (02 Oct 2023 06:18)      PHYSICAL EXAM:  GENERAL: NAD, breathing comfortably on NC   HEAD:  Atraumatic, normocephalic  EYES: EOMI, PERRL  NECK: Supple, trachea midline, no JVD  HEART: irreg rhythm   LUNGS: Unlabored respirations.  diminished BS bilat with intermittent upper airway wheeze   ABDOMEN: Soft, nontender, nondistended,  EXTREMITIES: 2+ peripheral pulses bilaterally. trace LE edema bilat   NERVOUS SYSTEM:  A&Ox3, moving all extremities, no focal deficits     HOSPITAL MEDICATIONS:  MEDICATIONS  (STANDING):  apixaban 5 milliGRAM(s) Oral every 12 hours  atorvastatin 20 milliGRAM(s) Oral at bedtime  azithromycin  IVPB 500 milliGRAM(s) IV Intermittent every 24 hours  cefTRIAXone   IVPB 1000 milliGRAM(s) IV Intermittent every 24 hours  dextrose 5%. 1000 milliLiter(s) (50 mL/Hr) IV Continuous <Continuous>  dextrose 5%. 1000 milliLiter(s) (100 mL/Hr) IV Continuous <Continuous>  dextrose 50% Injectable 12.5 Gram(s) IV Push once  dextrose 50% Injectable 25 Gram(s) IV Push once  dextrose 50% Injectable 25 Gram(s) IV Push once  furosemide   Injectable 40 milliGRAM(s) IV Push two times a day  glucagon  Injectable 1 milliGRAM(s) IntraMuscular once  influenza  Vaccine (HIGH DOSE) 0.7 milliLiter(s) IntraMuscular once  insulin lispro (ADMELOG) corrective regimen sliding scale   SubCutaneous three times a day before meals  insulin lispro (ADMELOG) corrective regimen sliding scale   SubCutaneous at bedtime  metoprolol tartrate 100 milliGRAM(s) Oral two times a day  sacubitril 49 mG/valsartan 51 mG 1 Tablet(s) Oral two times a day  spironolactone 25 milliGRAM(s) Oral daily    MEDICATIONS  (PRN):  acetaminophen     Tablet .. 650 milliGRAM(s) Oral every 6 hours PRN Mild Pain (1 - 3), Moderate Pain (4 - 6)  albuterol/ipratropium for Nebulization 3 milliLiter(s) Nebulizer every 6 hours PRN Shortness of Breath and/or Wheezing  dextrose Oral Gel 15 Gram(s) Oral once PRN Blood Glucose LESS THAN 70 milliGRAM(s)/deciliter  melatonin 3 milliGRAM(s) Oral at bedtime PRN Insomnia      LABS:                        11.4   8.14  )-----------( 283      ( 02 Oct 2023 06:10 )             38.3     10-02    137  |  102  |  20  ----------------------------<  301<H>  4.5   |  29  |  1.27    Ca    7.9<L>      02 Oct 2023 06:10    TPro  7.7  /  Alb  3.3  /  TBili  0.8  /  DBili  x   /  AST  147<H>  /  ALT  142<H>  /  AlkPhos  126<H>  10-02      Urinalysis Basic - ( 02 Oct 2023 06:10 )    Color: x / Appearance: x / SG: x / pH: x  Gluc: 301 mg/dL / Ketone: x  / Bili: x / Urobili: x   Blood: x / Protein: x / Nitrite: x   Leuk Esterase: x / RBC: x / WBC x   Sq Epi: x / Non Sq Epi: x / Bacteria: x      Arterial Blood Gas:  10-02 @ 06:38  7.30/67/165/33/99.8/4.4          MICROBIOLOGY: reviewed     RADIOLOGY:  [x ] Reviewed and interpreted by me    EKG: reviewed

## 2023-10-03 NOTE — PROGRESS NOTE ADULT - SUBJECTIVE AND OBJECTIVE BOX
CHIEF COMPLAINT: Patient is a 78y old  Female who presents with a chief complaint of copd/CHF exacerbation (02 Oct 2023 11:54)      Interval Events:  reports feeling much better  sitting up at edge of bed and performing ADLs  pending TTE  wore her BIPAP overnight and tolerated       OBJECTIVE:  ICU Vital Signs Last 24 Hrs  T(C): 36.7 (03 Oct 2023 11:19), Max: 36.7 (02 Oct 2023 20:08)  T(F): 98.1 (03 Oct 2023 11:19), Max: 98.1 (02 Oct 2023 20:08)  HR: 83 (03 Oct 2023 11:19) (74 - 116)  BP: 109/71 (03 Oct 2023 11:19) (109/71 - 147/81)  BP(mean): 103 (02 Oct 2023 17:32) (92 - 114)  ABP: --  ABP(mean): --  RR: 18 (03 Oct 2023 11:19) (16 - 23)  SpO2: 94% (03 Oct 2023 11:19) (91% - 100%)    O2 Parameters below as of 03 Oct 2023 05:20  Patient On (Oxygen Delivery Method): BiPAP/CPAP      10-03 @ 07:01  -  10-03 @ 11:33  --------------------------------------------------------  IN: 480 mL / OUT: 600 mL / NET: -120 mL      CAPILLARY BLOOD GLUCOSE      POCT Blood Glucose.: 120 mg/dL (03 Oct 2023 08:19)      HOSPITAL MEDICATIONS:  apixaban 5 milliGRAM(s) Oral every 12 hours    azithromycin  IVPB 500 milliGRAM(s) IV Intermittent every 24 hours  cefTRIAXone   IVPB 1000 milliGRAM(s) IV Intermittent every 24 hours    furosemide   Injectable 40 milliGRAM(s) IV Push two times a day  metoprolol tartrate 100 milliGRAM(s) Oral two times a day  sacubitril 49 mG/valsartan 51 mG 1 Tablet(s) Oral two times a day  spironolactone 25 milliGRAM(s) Oral daily    atorvastatin 20 milliGRAM(s) Oral at bedtime  dextrose 50% Injectable 12.5 Gram(s) IV Push once  dextrose 50% Injectable 25 Gram(s) IV Push once  dextrose 50% Injectable 25 Gram(s) IV Push once  dextrose Oral Gel 15 Gram(s) Oral once PRN  glucagon  Injectable 1 milliGRAM(s) IntraMuscular once  insulin lispro (ADMELOG) corrective regimen sliding scale   SubCutaneous three times a day before meals  insulin lispro (ADMELOG) corrective regimen sliding scale   SubCutaneous at bedtime    albuterol/ipratropium for Nebulization 3 milliLiter(s) Nebulizer every 6 hours PRN    acetaminophen     Tablet .. 650 milliGRAM(s) Oral every 6 hours PRN  melatonin 3 milliGRAM(s) Oral at bedtime PRN        dextrose 5%. 1000 milliLiter(s) IV Continuous <Continuous>  dextrose 5%. 1000 milliLiter(s) IV Continuous <Continuous>    influenza  Vaccine (HIGH DOSE) 0.7 milliLiter(s) IntraMuscular once        LABS:                        10.8   15.25 )-----------( 257      ( 03 Oct 2023 07:00 )             35.3     Hgb Trend: 10.8<--, 11.4<--  10-03    139  |  103  |  28<H>  ----------------------------<  120<H>  4.2   |  32<H>  |  1.10    Ca    8.0<L>      03 Oct 2023 07:00  Phos  4.2     10-03  Mg     2.4     10-03    TPro  7.4  /  Alb  3.3  /  TBili  0.4  /  DBili  x   /  AST  58<H>  /  ALT  121<H>  /  AlkPhos  106  10-03    Creatinine Trend: 1.10<--, 1.27<--    Urinalysis Basic - ( 03 Oct 2023 07:00 )    Color: x / Appearance: x / SG: x / pH: x  Gluc: 120 mg/dL / Ketone: x  / Bili: x / Urobili: x   Blood: x / Protein: x / Nitrite: x   Leuk Esterase: x / RBC: x / WBC x   Sq Epi: x / Non Sq Epi: x / Bacteria: x      Arterial Blood Gas:  10-02 @ 06:38  7.30/67/165/33/99.8/4.4  ABG lactate: --       CHIEF COMPLAINT: Patient is a 78y old  Female who presents with a chief complaint of copd/CHF exacerbation (02 Oct 2023 11:54)      Interval Events:  reports feeling much better  sitting up at edge of bed and performing ADLs by self  pending TTE  wore her BIPAP overnight and tolerated       OBJECTIVE:  ICU Vital Signs Last 24 Hrs  T(C): 36.7 (03 Oct 2023 11:19), Max: 36.7 (02 Oct 2023 20:08)  T(F): 98.1 (03 Oct 2023 11:19), Max: 98.1 (02 Oct 2023 20:08)  HR: 83 (03 Oct 2023 11:19) (74 - 116)  BP: 109/71 (03 Oct 2023 11:19) (109/71 - 147/81)  BP(mean): 103 (02 Oct 2023 17:32) (92 - 114)  ABP: --  ABP(mean): --  RR: 18 (03 Oct 2023 11:19) (16 - 23)  SpO2: 94% (03 Oct 2023 11:19) (91% - 100%)    O2 Parameters below as of 03 Oct 2023 05:20  Patient On (Oxygen Delivery Method): BiPAP/CPAP      10-03 @ 07:01  -  10-03 @ 11:33  --------------------------------------------------------  IN: 480 mL / OUT: 600 mL / NET: -120 mL      CAPILLARY BLOOD GLUCOSE      POCT Blood Glucose.: 120 mg/dL (03 Oct 2023 08:19)      HOSPITAL MEDICATIONS:  apixaban 5 milliGRAM(s) Oral every 12 hours    azithromycin  IVPB 500 milliGRAM(s) IV Intermittent every 24 hours  cefTRIAXone   IVPB 1000 milliGRAM(s) IV Intermittent every 24 hours    furosemide   Injectable 40 milliGRAM(s) IV Push two times a day  metoprolol tartrate 100 milliGRAM(s) Oral two times a day  sacubitril 49 mG/valsartan 51 mG 1 Tablet(s) Oral two times a day  spironolactone 25 milliGRAM(s) Oral daily    atorvastatin 20 milliGRAM(s) Oral at bedtime  dextrose 50% Injectable 12.5 Gram(s) IV Push once  dextrose 50% Injectable 25 Gram(s) IV Push once  dextrose 50% Injectable 25 Gram(s) IV Push once  dextrose Oral Gel 15 Gram(s) Oral once PRN  glucagon  Injectable 1 milliGRAM(s) IntraMuscular once  insulin lispro (ADMELOG) corrective regimen sliding scale   SubCutaneous three times a day before meals  insulin lispro (ADMELOG) corrective regimen sliding scale   SubCutaneous at bedtime    albuterol/ipratropium for Nebulization 3 milliLiter(s) Nebulizer every 6 hours PRN    acetaminophen     Tablet .. 650 milliGRAM(s) Oral every 6 hours PRN  melatonin 3 milliGRAM(s) Oral at bedtime PRN        dextrose 5%. 1000 milliLiter(s) IV Continuous <Continuous>  dextrose 5%. 1000 milliLiter(s) IV Continuous <Continuous>    influenza  Vaccine (HIGH DOSE) 0.7 milliLiter(s) IntraMuscular once        LABS:                        10.8   15.25 )-----------( 257      ( 03 Oct 2023 07:00 )             35.3     Hgb Trend: 10.8<--, 11.4<--  10-03    139  |  103  |  28<H>  ----------------------------<  120<H>  4.2   |  32<H>  |  1.10    Ca    8.0<L>      03 Oct 2023 07:00  Phos  4.2     10-03  Mg     2.4     10-03    TPro  7.4  /  Alb  3.3  /  TBili  0.4  /  DBili  x   /  AST  58<H>  /  ALT  121<H>  /  AlkPhos  106  10-03    Creatinine Trend: 1.10<--, 1.27<--    Urinalysis Basic - ( 03 Oct 2023 07:00 )    Color: x / Appearance: x / SG: x / pH: x  Gluc: 120 mg/dL / Ketone: x  / Bili: x / Urobili: x   Blood: x / Protein: x / Nitrite: x   Leuk Esterase: x / RBC: x / WBC x   Sq Epi: x / Non Sq Epi: x / Bacteria: x      Arterial Blood Gas:  10-02 @ 06:38  7.30/67/165/33/99.8/4.4  ABG lactate: --

## 2023-10-03 NOTE — PROGRESS NOTE ADULT - SUBJECTIVE AND OBJECTIVE BOX
78 years old female with history of HTN, HLD, CHF, A. fib, Morbid obesity, COPD, LEIDY presented w/ dyspnea due to acute respiratory failure with hypoxia and hypercapnia due to acute on chronic systolic congestive heart failure and CAP. Pt was also found to be in Afib with RVR. Pt required BiPAP. She is lying in bed.    MEDICATIONS  (STANDING):  apixaban 5 milliGRAM(s) Oral every 12 hours  atorvastatin 20 milliGRAM(s) Oral at bedtime  azithromycin  IVPB 500 milliGRAM(s) IV Intermittent every 24 hours  cefTRIAXone   IVPB 1000 milliGRAM(s) IV Intermittent every 24 hours  dextrose 5%. 1000 milliLiter(s) (50 mL/Hr) IV Continuous <Continuous>  dextrose 5%. 1000 milliLiter(s) (100 mL/Hr) IV Continuous <Continuous>  dextrose 50% Injectable 12.5 Gram(s) IV Push once  dextrose 50% Injectable 25 Gram(s) IV Push once  dextrose 50% Injectable 25 Gram(s) IV Push once  furosemide   Injectable 40 milliGRAM(s) IV Push two times a day  glucagon  Injectable 1 milliGRAM(s) IntraMuscular once  influenza  Vaccine (HIGH DOSE) 0.7 milliLiter(s) IntraMuscular once  insulin lispro (ADMELOG) corrective regimen sliding scale   SubCutaneous three times a day before meals  insulin lispro (ADMELOG) corrective regimen sliding scale   SubCutaneous at bedtime  metoprolol tartrate 100 milliGRAM(s) Oral two times a day  sacubitril 49 mG/valsartan 51 mG 1 Tablet(s) Oral two times a day  spironolactone 25 milliGRAM(s) Oral daily    MEDICATIONS  (PRN):  acetaminophen     Tablet .. 650 milliGRAM(s) Oral every 6 hours PRN Mild Pain (1 - 3), Moderate Pain (4 - 6)  albuterol/ipratropium for Nebulization 3 milliLiter(s) Nebulizer every 6 hours PRN Shortness of Breath and/or Wheezing  dextrose Oral Gel 15 Gram(s) Oral once PRN Blood Glucose LESS THAN 70 milliGRAM(s)/deciliter  melatonin 3 milliGRAM(s) Oral at bedtime PRN Insomnia      Allergies    No Known Allergies    Intolerances        Vital Signs Last 24 Hrs  T(C): 36.5 (03 Oct 2023 16:47), Max: 36.7 (03 Oct 2023 01:22)  T(F): 97.7 (03 Oct 2023 16:47), Max: 98.1 (03 Oct 2023 01:22)  HR: 101 (03 Oct 2023 19:00) (75 - 106)  BP: 127/87 (03 Oct 2023 16:47) (109/71 - 147/81)   RR: 18 (03 Oct 2023 19:00) (16 - 18)  SpO2: 96% (03 Oct 2023 19:00) (94% - 100%)    Parameters below as of 03 Oct 2023 19:00  Patient On (Oxygen Delivery Method): nasal cannula  O2 Flow (L/min): 2      PHYSICAL EXAM:  GENERAL: NAD, obese  HEAD:  Atraumatic, Normocephalic  EYES: EOMI  NECK: Supple   NERVOUS SYSTEM: lethargic  CHEST/LUNG: bilateral Wheezing  HEART: Regular rate and rhythm; No murmurs, rubs, or gallops  ABDOMEN: Soft, Nontender, Nondistended; Bowel sounds present  EXTREMITIES: bilateral LE edema       LABS:                        10.8   15.25 )-----------( 257      ( 03 Oct 2023 07:00 )             35.3     10-03    139  |  103  |  28<H>  ----------------------------<  120<H>  4.2   |  32<H>  |  1.10    Ca    8.0<L>      03 Oct 2023 07:00  Phos  4.2     10-03  Mg     2.4     10-03    TPro  7.4  /  Alb  3.3  /  TBili  0.4  /  DBili  x   /  AST  58<H>  /  ALT  121<H>  /  AlkPhos  106  10-03      Urinalysis Basic - ( 03 Oct 2023 07:00 )    Color: x / Appearance: x / SG: x / pH: x  Gluc: 120 mg/dL / Ketone: x  / Bili: x / Urobili: x   Blood: x / Protein: x / Nitrite: x   Leuk Esterase: x / RBC: x / WBC x   Sq Epi: x / Non Sq Epi: x / Bacteria: x     POCT Blood Glucose.: 122 mg/dL (03 Oct 2023 17:10)  POCT Blood Glucose.: 117 mg/dL (03 Oct 2023 13:04)  POCT Blood Glucose.: 120 mg/dL (03 Oct 2023 08:19)  POCT Blood Glucose.: 160 mg/dL (02 Oct 2023 23:23)      Culture - Blood (collected 02 Oct 2023 15:15)  Source: .Blood Blood-Peripheral  Preliminary Report (03 Oct 2023 20:01):    No growth at 24 hours    Culture - Blood (collected 02 Oct 2023 15:00)  Source: .Blood Blood-Peripheral  Preliminary Report (03 Oct 2023 20:01):    No growth at 24 hours      RADIOLOGY & ADDITIONAL TESTS:    10-03-23 @ 07:01  -  10-03-23 @ 20:21  --------------------------------------------------------  IN:    Oral Fluid: 700 mL  Total IN: 700 mL    OUT:    Voided (mL): 600 mL  Total OUT: 600 mL    Total NET: 100 mL

## 2023-10-04 LAB
ALBUMIN SERPL ELPH-MCNC: 3.2 G/DL — LOW (ref 3.3–5)
ALP SERPL-CCNC: 93 U/L — SIGNIFICANT CHANGE UP (ref 40–120)
ALT FLD-CCNC: 103 U/L — HIGH (ref 12–78)
ANION GAP SERPL CALC-SCNC: 5 MMOL/L — SIGNIFICANT CHANGE UP (ref 5–17)
AST SERPL-CCNC: 39 U/L — HIGH (ref 15–37)
BILIRUB SERPL-MCNC: 0.6 MG/DL — SIGNIFICANT CHANGE UP (ref 0.2–1.2)
BUN SERPL-MCNC: 32 MG/DL — HIGH (ref 7–23)
CALCIUM SERPL-MCNC: 7.9 MG/DL — LOW (ref 8.5–10.1)
CHLORIDE SERPL-SCNC: 103 MMOL/L — SIGNIFICANT CHANGE UP (ref 96–108)
CO2 SERPL-SCNC: 36 MMOL/L — HIGH (ref 22–31)
CREAT SERPL-MCNC: 1.07 MG/DL — SIGNIFICANT CHANGE UP (ref 0.5–1.3)
EGFR: 54 ML/MIN/1.73M2 — LOW
GLUCOSE BLDC GLUCOMTR-MCNC: 116 MG/DL — HIGH (ref 70–99)
GLUCOSE BLDC GLUCOMTR-MCNC: 118 MG/DL — HIGH (ref 70–99)
GLUCOSE BLDC GLUCOMTR-MCNC: 121 MG/DL — HIGH (ref 70–99)
GLUCOSE BLDC GLUCOMTR-MCNC: 198 MG/DL — HIGH (ref 70–99)
GLUCOSE SERPL-MCNC: 116 MG/DL — HIGH (ref 70–99)
HCT VFR BLD CALC: 35.6 % — SIGNIFICANT CHANGE UP (ref 34.5–45)
HGB BLD-MCNC: 10.7 G/DL — LOW (ref 11.5–15.5)
LEGIONELLA AG UR QL: NEGATIVE — SIGNIFICANT CHANGE UP
M PNEUMO IGM SER-ACNC: 1.21 INDEX — HIGH (ref 0–0.9)
MAGNESIUM SERPL-MCNC: 2.1 MG/DL — SIGNIFICANT CHANGE UP (ref 1.6–2.6)
MCHC RBC-ENTMCNC: 30.1 G/DL — LOW (ref 32–36)
MCHC RBC-ENTMCNC: 31.5 PG — SIGNIFICANT CHANGE UP (ref 27–34)
MCV RBC AUTO: 104.7 FL — HIGH (ref 80–100)
MYCOPLASMA AG SPEC QL: POSITIVE
NRBC # BLD: 0 /100 WBCS — SIGNIFICANT CHANGE UP (ref 0–0)
PHOSPHATE SERPL-MCNC: 3.1 MG/DL — SIGNIFICANT CHANGE UP (ref 2.5–4.5)
PLATELET # BLD AUTO: 269 K/UL — SIGNIFICANT CHANGE UP (ref 150–400)
POTASSIUM SERPL-MCNC: 3.7 MMOL/L — SIGNIFICANT CHANGE UP (ref 3.5–5.3)
POTASSIUM SERPL-SCNC: 3.7 MMOL/L — SIGNIFICANT CHANGE UP (ref 3.5–5.3)
PROT SERPL-MCNC: 7.4 GM/DL — SIGNIFICANT CHANGE UP (ref 6–8.3)
RBC # BLD: 3.4 M/UL — LOW (ref 3.8–5.2)
RBC # FLD: 12.7 % — SIGNIFICANT CHANGE UP (ref 10.3–14.5)
S PNEUM AG UR QL: NEGATIVE — SIGNIFICANT CHANGE UP
SODIUM SERPL-SCNC: 144 MMOL/L — SIGNIFICANT CHANGE UP (ref 135–145)
WBC # BLD: 10.28 K/UL — SIGNIFICANT CHANGE UP (ref 3.8–10.5)
WBC # FLD AUTO: 10.28 K/UL — SIGNIFICANT CHANGE UP (ref 3.8–10.5)

## 2023-10-04 PROCEDURE — 99233 SBSQ HOSP IP/OBS HIGH 50: CPT

## 2023-10-04 RX ORDER — POTASSIUM CHLORIDE 20 MEQ
20 PACKET (EA) ORAL
Refills: 0 | Status: COMPLETED | OUTPATIENT
Start: 2023-10-04 | End: 2023-10-04

## 2023-10-04 RX ORDER — ACETAMINOPHEN 500 MG
1000 TABLET ORAL ONCE
Refills: 0 | Status: COMPLETED | OUTPATIENT
Start: 2023-10-04 | End: 2023-10-04

## 2023-10-04 RX ORDER — IPRATROPIUM/ALBUTEROL SULFATE 18-103MCG
3 AEROSOL WITH ADAPTER (GRAM) INHALATION EVERY 6 HOURS
Refills: 0 | Status: DISCONTINUED | OUTPATIENT
Start: 2023-10-04 | End: 2023-10-10

## 2023-10-04 RX ORDER — AZITHROMYCIN 500 MG/1
500 TABLET, FILM COATED ORAL EVERY 24 HOURS
Refills: 0 | Status: DISCONTINUED | OUTPATIENT
Start: 2023-10-05 | End: 2023-10-05

## 2023-10-04 RX ORDER — AZITHROMYCIN 500 MG/1
500 TABLET, FILM COATED ORAL EVERY 24 HOURS
Refills: 0 | Status: DISCONTINUED | OUTPATIENT
Start: 2023-10-04 | End: 2023-10-04

## 2023-10-04 RX ADMIN — Medication 3 MILLILITER(S): at 13:29

## 2023-10-04 RX ADMIN — Medication 3 MILLILITER(S): at 23:39

## 2023-10-04 RX ADMIN — Medication 1: at 12:10

## 2023-10-04 RX ADMIN — Medication 100 MILLIGRAM(S): at 13:49

## 2023-10-04 RX ADMIN — Medication 20 MILLIEQUIVALENT(S): at 20:21

## 2023-10-04 RX ADMIN — SACUBITRIL AND VALSARTAN 1 TABLET(S): 24; 26 TABLET, FILM COATED ORAL at 17:23

## 2023-10-04 RX ADMIN — SPIRONOLACTONE 25 MILLIGRAM(S): 25 TABLET, FILM COATED ORAL at 05:52

## 2023-10-04 RX ADMIN — SACUBITRIL AND VALSARTAN 1 TABLET(S): 24; 26 TABLET, FILM COATED ORAL at 05:51

## 2023-10-04 RX ADMIN — Medication 40 MILLIGRAM(S): at 13:48

## 2023-10-04 RX ADMIN — Medication 20 MILLIEQUIVALENT(S): at 18:06

## 2023-10-04 RX ADMIN — APIXABAN 5 MILLIGRAM(S): 2.5 TABLET, FILM COATED ORAL at 05:52

## 2023-10-04 RX ADMIN — Medication 100 MILLIGRAM(S): at 17:23

## 2023-10-04 RX ADMIN — Medication 400 MILLIGRAM(S): at 18:06

## 2023-10-04 RX ADMIN — Medication 100 MILLIGRAM(S): at 21:46

## 2023-10-04 RX ADMIN — Medication 3 MILLILITER(S): at 17:09

## 2023-10-04 RX ADMIN — Medication 40 MILLIGRAM(S): at 05:52

## 2023-10-04 RX ADMIN — CEFTRIAXONE 100 MILLIGRAM(S): 500 INJECTION, POWDER, FOR SOLUTION INTRAMUSCULAR; INTRAVENOUS at 10:31

## 2023-10-04 RX ADMIN — ATORVASTATIN CALCIUM 20 MILLIGRAM(S): 80 TABLET, FILM COATED ORAL at 21:46

## 2023-10-04 RX ADMIN — AZITHROMYCIN 255 MILLIGRAM(S): 500 TABLET, FILM COATED ORAL at 11:26

## 2023-10-04 RX ADMIN — APIXABAN 5 MILLIGRAM(S): 2.5 TABLET, FILM COATED ORAL at 17:23

## 2023-10-04 RX ADMIN — Medication 100 MILLIGRAM(S): at 05:52

## 2023-10-04 NOTE — DIETITIAN INITIAL EVALUATION ADULT - OTHER INFO
Pt with PMH of HTN, HLD, CHF, Afib, morbid obesity, COPD, LEIDY presented with dyspnea due to acute respiratory failure with hypoxia and hypercapnia due to acute on chronic systolic CHF and CAP. Also found to be in Afib with RVR. Currently on 2L NC and tolerating well.  Pt with no hx of DM and JilC6b=1.6% (not even in pre-DM range), however pt with hyperglycemia during admission and on insulin. Will add consistent CHO to current diet to help improve glucose levels.  Pt appears slightly forgetful. States good appetite and good PO intake PTA; no dietary restrictions PTA.  Continues with good appetite; consuming % of documented meals during admission as per flow sheet.  Denies any chew/swallowing difficulty or any N/V/D/C.  Unsure of UBW, however states it is over 200 lbs. Admission wt listed as 109.1 kg and current wt 97.7 kg; wt difference could be due to admission wt being stated/estimated wt, and/or due to pt being diuresed during admission. No physical signs of malnutrition noted.

## 2023-10-04 NOTE — DIETITIAN INITIAL EVALUATION ADULT - PERSON TAUGHT/METHOD
Provided pt with Heart Healthy nutrition counseling/education materials; discussed importance of heart healthy low sodium diet, monitoring for fluid retention, & daily weights. All of pt's questions answered to her satisfaction. Made aware RD remains available./verbal instruction/written material/patient instructed

## 2023-10-04 NOTE — DIETITIAN INITIAL EVALUATION ADULT - PERTINENT LABORATORY DATA
10-04    144  |  103  |  32<H>  ----------------------------<  116<H>  3.7   |  36<H>  |  1.07    Ca    7.9<L>      04 Oct 2023 07:47  Phos  3.1     10-04  Mg     2.1     10-04    TPro  7.4  /  Alb  3.2<L>  /  TBili  0.6  /  DBili  x   /  AST  39<H>  /  ALT  103<H>  /  AlkPhos  93  10-04  POCT Blood Glucose.: 198 mg/dL (10-04-23 @ 11:20)  A1C with Estimated Average Glucose Result: 5.6 % (10-03-23 @ 07:00)

## 2023-10-04 NOTE — DIETITIAN INITIAL EVALUATION ADULT - PERTINENT MEDS FT
MEDICATIONS  (STANDING):  albuterol/ipratropium for Nebulization 3 milliLiter(s) Nebulizer every 6 hours  apixaban 5 milliGRAM(s) Oral every 12 hours  atorvastatin 20 milliGRAM(s) Oral at bedtime  benzonatate 100 milliGRAM(s) Oral every 8 hours  cefTRIAXone   IVPB 1000 milliGRAM(s) IV Intermittent every 24 hours  dextrose 5%. 1000 milliLiter(s) (100 mL/Hr) IV Continuous <Continuous>  dextrose 5%. 1000 milliLiter(s) (50 mL/Hr) IV Continuous <Continuous>  dextrose 50% Injectable 12.5 Gram(s) IV Push once  dextrose 50% Injectable 25 Gram(s) IV Push once  dextrose 50% Injectable 25 Gram(s) IV Push once  furosemide   Injectable 40 milliGRAM(s) IV Push two times a day  glucagon  Injectable 1 milliGRAM(s) IntraMuscular once  influenza  Vaccine (HIGH DOSE) 0.7 milliLiter(s) IntraMuscular once  insulin lispro (ADMELOG) corrective regimen sliding scale   SubCutaneous three times a day before meals  insulin lispro (ADMELOG) corrective regimen sliding scale   SubCutaneous at bedtime  metoprolol tartrate 100 milliGRAM(s) Oral two times a day  sacubitril 49 mG/valsartan 51 mG 1 Tablet(s) Oral two times a day  spironolactone 25 milliGRAM(s) Oral daily    MEDICATIONS  (PRN):  acetaminophen     Tablet .. 650 milliGRAM(s) Oral every 6 hours PRN Mild Pain (1 - 3), Moderate Pain (4 - 6)  dextrose Oral Gel 15 Gram(s) Oral once PRN Blood Glucose LESS THAN 70 milliGRAM(s)/deciliter  guaiFENesin Oral Liquid (Sugar-Free) 200 milliGRAM(s) Oral every 6 hours PRN Cough  melatonin 3 milliGRAM(s) Oral at bedtime PRN Insomnia

## 2023-10-04 NOTE — PROGRESS NOTE ADULT - ASSESSMENT
78 years old female with history of HTN, HLD, CHF, A. fib, Morbid obesity, COPD, LEIDY presented w/ dyspnea due to acute respiratory failure with hypoxia and hypercapnia due to acute on chronic systolic congestive heart failure and CAP. Pt was also found to be in Afib with RVR. Pt required BiPAP.      Acute on chronic systolic congestive heart failure    - CXR with pulmonary congestion   - CT chest showed ground-glass opacities likely due to pulmonary edema in the setting of mild septal thickening, small pleural effusions and cardiomegaly. mosaic lung parenchyma with suspected air trapping.  - patient says she missed Lasix for a few days  - c/w IV Lasix 40mg bid, hope to transition to po 10/5  - ECHO showed EF 45-50%, moderate tricuspid regurgitation with moderate pulmonary hypertension  - c/w telemetry    - Monitor I/O & daily weight  - c/w Entresto, metoprolol, aldactone & Lipitor  - c/w BiPAP QHS    CAP  - c/w Ceftriaxone, azithromycin for possible underling pneumonia - will rescan chest once he's diuresed  - respiratory PCR negative, pending: urine legionella Ag, strep pneumo Ag, CT chest   - Pul note read and appreciated   - c/w Duoneb q6h standing for now     Atrial fibrillation with rapid ventricular response   - rate controlled w/ metoprolol    - ECHO showed EF 45-50%, moderate tricuspid regurgitation with moderate pulmonary hypertension  - c/w telemetry monitoring  - c/w Apixaban       Hyperglycemia  - c/w ISS and hypoglycemia protocol  - A1C is 5.6    Euthyroid sick syndrome  - TSH is 0.133, free thyroxine is 1.2     Ill-defined 4 mm nodule at the left apex   - will need to get follow up scan once patient improves    HTN  - c/w metoprolol     Hyperlipidemia  - c/w Lipitor    Prophylaxis:  GI: PO diet  DVT: Apixaban

## 2023-10-04 NOTE — PROGRESS NOTE ADULT - SUBJECTIVE AND OBJECTIVE BOX
[facility] Division of Hospital Medicine  Edie Monique MD  Available via MS Teams  Pager: [phone]    SUBJECTIVE / OVERNIGHT EVENTS:  still has extensive shortness of breath and cough but states it is somewhat improving. otherwise denies any chest pain, lower extremity pain. Swelling in the legs are improving, but still present. Denies any n/v/ abd pain.     MEDICATIONS  (STANDING):  albuterol/ipratropium for Nebulization 3 milliLiter(s) Nebulizer every 6 hours  apixaban 5 milliGRAM(s) Oral every 12 hours  atorvastatin 20 milliGRAM(s) Oral at bedtime  benzonatate 100 milliGRAM(s) Oral every 8 hours  cefTRIAXone   IVPB 1000 milliGRAM(s) IV Intermittent every 24 hours  dextrose 5%. 1000 milliLiter(s) (100 mL/Hr) IV Continuous <Continuous>  dextrose 5%. 1000 milliLiter(s) (50 mL/Hr) IV Continuous <Continuous>  dextrose 50% Injectable 12.5 Gram(s) IV Push once  dextrose 50% Injectable 25 Gram(s) IV Push once  dextrose 50% Injectable 25 Gram(s) IV Push once  furosemide   Injectable 40 milliGRAM(s) IV Push two times a day  glucagon  Injectable 1 milliGRAM(s) IntraMuscular once  influenza  Vaccine (HIGH DOSE) 0.7 milliLiter(s) IntraMuscular once  insulin lispro (ADMELOG) corrective regimen sliding scale   SubCutaneous three times a day before meals  insulin lispro (ADMELOG) corrective regimen sliding scale   SubCutaneous at bedtime  metoprolol tartrate 100 milliGRAM(s) Oral two times a day  potassium chloride    Tablet ER 20 milliEquivalent(s) Oral every 2 hours  sacubitril 49 mG/valsartan 51 mG 1 Tablet(s) Oral two times a day  spironolactone 25 milliGRAM(s) Oral daily    MEDICATIONS  (PRN):  acetaminophen     Tablet .. 650 milliGRAM(s) Oral every 6 hours PRN Mild Pain (1 - 3), Moderate Pain (4 - 6)  dextrose Oral Gel 15 Gram(s) Oral once PRN Blood Glucose LESS THAN 70 milliGRAM(s)/deciliter  guaiFENesin Oral Liquid (Sugar-Free) 200 milliGRAM(s) Oral every 6 hours PRN Cough  melatonin 3 milliGRAM(s) Oral at bedtime PRN Insomnia      I&O's Summary    03 Oct 2023 07:01  -  04 Oct 2023 07:00  --------------------------------------------------------  IN: 700 mL / OUT: 600 mL / NET: 100 mL        PHYSICAL EXAM:  Vital Signs Last 24 Hrs  T(C): 37.2 (04 Oct 2023 16:20), Max: 37.2 (04 Oct 2023 16:20)  T(F): 99 (04 Oct 2023 16:20), Max: 99 (04 Oct 2023 16:20)  HR: 100 (04 Oct 2023 16:30) (77 - 106)  BP: 143/81 (04 Oct 2023 16:20) (112/81 - 147/86)  BP(mean): --  RR: 18 (04 Oct 2023 16:20) (18 - 20)  SpO2: 98% (04 Oct 2023 16:30) (95% - 98%)    Parameters below as of 04 Oct 2023 13:42  Patient On (Oxygen Delivery Method): nasal cannula      GENERAL: NAD, well-developed  HEAD:  Atraumatic, Normocephalic  EYES: EOMI, PERRLA, conjunctiva and sclera clear  NECK: Supple, No JVD  CHEST/LUNG: bilateral wheezing in upper and lower lung fields no crackles present   HEART: Regular rate and rhythm; No murmurs, rubs, or gallops  ABDOMEN: Soft, Nontender, Nondistended; Bowel sounds present  EXTREMITIES:  2+ Peripheral Pulses, No clubbing, cyanosis. 1+ pitting edema b/l up to knees   PSYCH: AAOx3, appropriate affect  NEUROLOGY: non-focal, deal  SKIN: No rashes or lesions       LABS:                        10.7   10.28 )-----------( 269      ( 04 Oct 2023 07:47 )             35.6     10-04    144  |  103  |  32<H>  ----------------------------<  116<H>  3.7   |  36<H>  |  1.07    Ca    7.9<L>      04 Oct 2023 07:47  Phos  3.1     10-04  Mg     2.1     10-04    TPro  7.4  /  Alb  3.2<L>  /  TBili  0.6  /  DBili  x   /  AST  39<H>  /  ALT  103<H>  /  AlkPhos  93  10-04          Urinalysis Basic - ( 04 Oct 2023 07:47 )    Color: x / Appearance: x / SG: x / pH: x  Gluc: 116 mg/dL / Ketone: x  / Bili: x / Urobili: x   Blood: x / Protein: x / Nitrite: x   Leuk Esterase: x / RBC: x / WBC x   Sq Epi: x / Non Sq Epi: x / Bacteria: x        Culture - Blood (collected 02 Oct 2023 15:15)  Source: .Blood Blood-Peripheral  Preliminary Report (03 Oct 2023 20:01):    No growth at 24 hours    Culture - Blood (collected 02 Oct 2023 15:00)  Source: .Blood Blood-Peripheral  Preliminary Report (03 Oct 2023 20:01):    No growth at 24 hours          RADIOLOGY & ADDITIONAL TESTS:  New Results Reviewed Today:   New Imaging Personally Reviewed Today:  New Electrocardiogram Personally Reviewed Today:  Prior or Outpatient Records Reviewed Today:    COMMUNICATION:  Care Discussed with Consultants/Other Providers and Details of Discussion:  Discussions with Patient/Family:  PCP Communication:

## 2023-10-05 LAB
ANION GAP SERPL CALC-SCNC: 1 MMOL/L — LOW (ref 5–17)
BASOPHILS # BLD AUTO: 0.01 K/UL — SIGNIFICANT CHANGE UP (ref 0–0.2)
BASOPHILS NFR BLD AUTO: 0.2 % — SIGNIFICANT CHANGE UP (ref 0–2)
BUN SERPL-MCNC: 30 MG/DL — HIGH (ref 7–23)
CALCIUM SERPL-MCNC: 7.6 MG/DL — LOW (ref 8.5–10.1)
CHLORIDE SERPL-SCNC: 97 MMOL/L — SIGNIFICANT CHANGE UP (ref 96–108)
CO2 SERPL-SCNC: 39 MMOL/L — HIGH (ref 22–31)
CREAT SERPL-MCNC: 0.92 MG/DL — SIGNIFICANT CHANGE UP (ref 0.5–1.3)
EGFR: 65 ML/MIN/1.73M2 — SIGNIFICANT CHANGE UP
EOSINOPHIL # BLD AUTO: 0.09 K/UL — SIGNIFICANT CHANGE UP (ref 0–0.5)
EOSINOPHIL NFR BLD AUTO: 1.4 % — SIGNIFICANT CHANGE UP (ref 0–6)
GAS PNL BLDA: SIGNIFICANT CHANGE UP
GLUCOSE BLDC GLUCOMTR-MCNC: 121 MG/DL — HIGH (ref 70–99)
GLUCOSE BLDC GLUCOMTR-MCNC: 135 MG/DL — HIGH (ref 70–99)
GLUCOSE BLDC GLUCOMTR-MCNC: 135 MG/DL — HIGH (ref 70–99)
GLUCOSE BLDC GLUCOMTR-MCNC: 164 MG/DL — HIGH (ref 70–99)
GLUCOSE SERPL-MCNC: 142 MG/DL — HIGH (ref 70–99)
HCT VFR BLD CALC: 36 % — SIGNIFICANT CHANGE UP (ref 34.5–45)
HGB BLD-MCNC: 10.7 G/DL — LOW (ref 11.5–15.5)
IMM GRANULOCYTES NFR BLD AUTO: 0.5 % — SIGNIFICANT CHANGE UP (ref 0–0.9)
LYMPHOCYTES # BLD AUTO: 1.53 K/UL — SIGNIFICANT CHANGE UP (ref 1–3.3)
LYMPHOCYTES # BLD AUTO: 24.4 % — SIGNIFICANT CHANGE UP (ref 13–44)
MAGNESIUM SERPL-MCNC: 2 MG/DL — SIGNIFICANT CHANGE UP (ref 1.6–2.6)
MCHC RBC-ENTMCNC: 29.7 G/DL — LOW (ref 32–36)
MCHC RBC-ENTMCNC: 31 PG — SIGNIFICANT CHANGE UP (ref 27–34)
MCV RBC AUTO: 104.3 FL — HIGH (ref 80–100)
MONOCYTES # BLD AUTO: 0.72 K/UL — SIGNIFICANT CHANGE UP (ref 0–0.9)
MONOCYTES NFR BLD AUTO: 11.5 % — SIGNIFICANT CHANGE UP (ref 2–14)
NEUTROPHILS # BLD AUTO: 3.88 K/UL — SIGNIFICANT CHANGE UP (ref 1.8–7.4)
NEUTROPHILS NFR BLD AUTO: 62 % — SIGNIFICANT CHANGE UP (ref 43–77)
NRBC # BLD: 0 /100 WBCS — SIGNIFICANT CHANGE UP (ref 0–0)
PHOSPHATE SERPL-MCNC: 3.3 MG/DL — SIGNIFICANT CHANGE UP (ref 2.5–4.5)
PLATELET # BLD AUTO: 267 K/UL — SIGNIFICANT CHANGE UP (ref 150–400)
POTASSIUM SERPL-MCNC: 3.5 MMOL/L — SIGNIFICANT CHANGE UP (ref 3.5–5.3)
POTASSIUM SERPL-SCNC: 3.5 MMOL/L — SIGNIFICANT CHANGE UP (ref 3.5–5.3)
RBC # BLD: 3.45 M/UL — LOW (ref 3.8–5.2)
RBC # FLD: 12.4 % — SIGNIFICANT CHANGE UP (ref 10.3–14.5)
SODIUM SERPL-SCNC: 137 MMOL/L — SIGNIFICANT CHANGE UP (ref 135–145)
WBC # BLD: 6.26 K/UL — SIGNIFICANT CHANGE UP (ref 3.8–10.5)
WBC # FLD AUTO: 6.26 K/UL — SIGNIFICANT CHANGE UP (ref 3.8–10.5)

## 2023-10-05 PROCEDURE — 99233 SBSQ HOSP IP/OBS HIGH 50: CPT

## 2023-10-05 RX ORDER — POTASSIUM CHLORIDE 20 MEQ
20 PACKET (EA) ORAL
Refills: 0 | Status: COMPLETED | OUTPATIENT
Start: 2023-10-05 | End: 2023-10-05

## 2023-10-05 RX ADMIN — Medication 3 MILLILITER(S): at 23:27

## 2023-10-05 RX ADMIN — Medication 100 MILLIGRAM(S): at 05:47

## 2023-10-05 RX ADMIN — APIXABAN 5 MILLIGRAM(S): 2.5 TABLET, FILM COATED ORAL at 17:37

## 2023-10-05 RX ADMIN — Medication 40 MILLIGRAM(S): at 05:47

## 2023-10-05 RX ADMIN — Medication 100 MILLIGRAM(S): at 21:51

## 2023-10-05 RX ADMIN — Medication 100 MILLIGRAM(S): at 13:13

## 2023-10-05 RX ADMIN — ATORVASTATIN CALCIUM 20 MILLIGRAM(S): 80 TABLET, FILM COATED ORAL at 21:51

## 2023-10-05 RX ADMIN — Medication 3 MILLILITER(S): at 05:06

## 2023-10-05 RX ADMIN — Medication 20 MILLIEQUIVALENT(S): at 11:57

## 2023-10-05 RX ADMIN — APIXABAN 5 MILLIGRAM(S): 2.5 TABLET, FILM COATED ORAL at 05:46

## 2023-10-05 RX ADMIN — SACUBITRIL AND VALSARTAN 1 TABLET(S): 24; 26 TABLET, FILM COATED ORAL at 17:37

## 2023-10-05 RX ADMIN — Medication 100 MILLIGRAM(S): at 17:37

## 2023-10-05 RX ADMIN — AZITHROMYCIN 255 MILLIGRAM(S): 500 TABLET, FILM COATED ORAL at 05:46

## 2023-10-05 RX ADMIN — Medication 100 MILLIGRAM(S): at 05:46

## 2023-10-05 RX ADMIN — SPIRONOLACTONE 25 MILLIGRAM(S): 25 TABLET, FILM COATED ORAL at 05:47

## 2023-10-05 RX ADMIN — Medication 20 MILLIEQUIVALENT(S): at 13:13

## 2023-10-05 RX ADMIN — Medication 20 MILLIEQUIVALENT(S): at 15:43

## 2023-10-05 RX ADMIN — Medication 3 MILLILITER(S): at 11:29

## 2023-10-05 RX ADMIN — SACUBITRIL AND VALSARTAN 1 TABLET(S): 24; 26 TABLET, FILM COATED ORAL at 05:46

## 2023-10-05 RX ADMIN — Medication 3 MILLILITER(S): at 17:03

## 2023-10-05 RX ADMIN — Medication 40 MILLIGRAM(S): at 13:13

## 2023-10-05 RX ADMIN — CEFTRIAXONE 100 MILLIGRAM(S): 500 INJECTION, POWDER, FOR SOLUTION INTRAMUSCULAR; INTRAVENOUS at 11:57

## 2023-10-05 NOTE — PROGRESS NOTE ADULT - ASSESSMENT
76 YO female with PMH HTN, ?COPD, CHF, afib, LEIDY on CPAP 7 nightly, presents for SOB and cough, admitted for acute hypoxic and hypercapnic respiratory failure.      Dx: hypoxic/hypercapneic respiratory failure 2/2 viral +/- bacterial pna vs CHF exacerbation vs COPD exacerbation?    - satting well on 4LNC-->3LNC. maintain sats>90%  - CXR with pulm edema + RLL opacity with air bronchograms   - lung US 10/2 with A lines bilat with bibasilar consolidations   - hypoxic respiratory failure likely 2/2 pneumonia given consolidations on POCUS, continue CAP coverage. can d/c zithro as legionella negative.   - agree with continued diuresis for possible pulmonary edema as well   -ABG wnl this AM. cont with Bipap qhs for underlying LEIDY/chronic hypercapnia    d/w dr narayanan

## 2023-10-05 NOTE — PROGRESS NOTE ADULT - SUBJECTIVE AND OBJECTIVE BOX
INTERVAL HPI/OVERNIGHT EVENTS: no acute events overnight. pt wore her bipap overnight. Pt satting 95% on 4LNC. Nc downtitrated to 3LNC    SUBJECTIVE: Patient seen and examined at bedside. Pt endorses much improvement in her SOB since few days ago. Denies current CP, SOB, palpitations, cough     ROS: All negative except as listed above.    VITAL SIGNS:  ICU Vital Signs Last 24 Hrs  T(C): 36.4 (05 Oct 2023 05:19), Max: 37.2 (04 Oct 2023 16:20)  T(F): 97.6 (05 Oct 2023 05:19), Max: 99 (04 Oct 2023 16:20)  HR: 98 (05 Oct 2023 08:45) (78 - 106)  BP: 132/73 (05 Oct 2023 05:19) (128/78 - 143/81)  BP(mean): --  ABP: --  ABP(mean): --  RR: 18 (05 Oct 2023 05:19) (18 - 18)  SpO2: 97% (05 Oct 2023 08:45) (95% - 100%)    O2 Parameters below as of 05 Oct 2023 05:06  Patient On (Oxygen Delivery Method): BiPAP/CPAP      10-04 @ 07:01  -  10-05 @ 07:00  --------------------------------------------------------  IN: 220 mL / OUT: 500 mL / NET: -280 mL      CAPILLARY BLOOD GLUCOSE      POCT Blood Glucose.: 135 mg/dL (05 Oct 2023 10:51)      ECG: reviewed.    PHYSICAL EXAM:    GENERAL: NAD,  sitting on edge of bed comfortably   HEAD:  Atraumatic, normocephalic  EYES: EOMI, PERRL  NECK: Supple, trachea midline, no JVD  HEART: Regular rate and rhythm  LUNGS: Unlabored respirations. upper airway wheezing bilat   ABDOMEN: Soft, nontender, nondistended, +BS  EXTREMITIES: 2+ peripheral pulses bilaterally, cap refill<2 secs. 1+ LE edema bilat   NERVOUS SYSTEM:  A&Ox3, following commands, moving all extremities, no focal deficits       MEDICATIONS:  MEDICATIONS  (STANDING):  albuterol/ipratropium for Nebulization 3 milliLiter(s) Nebulizer every 6 hours  apixaban 5 milliGRAM(s) Oral every 12 hours  atorvastatin 20 milliGRAM(s) Oral at bedtime  azithromycin  IVPB 500 milliGRAM(s) IV Intermittent every 24 hours  benzonatate 100 milliGRAM(s) Oral every 8 hours  cefTRIAXone   IVPB 1000 milliGRAM(s) IV Intermittent every 24 hours  dextrose 5%. 1000 milliLiter(s) (100 mL/Hr) IV Continuous <Continuous>  dextrose 5%. 1000 milliLiter(s) (50 mL/Hr) IV Continuous <Continuous>  dextrose 50% Injectable 12.5 Gram(s) IV Push once  dextrose 50% Injectable 25 Gram(s) IV Push once  dextrose 50% Injectable 25 Gram(s) IV Push once  furosemide   Injectable 40 milliGRAM(s) IV Push two times a day  glucagon  Injectable 1 milliGRAM(s) IntraMuscular once  influenza  Vaccine (HIGH DOSE) 0.7 milliLiter(s) IntraMuscular once  insulin lispro (ADMELOG) corrective regimen sliding scale   SubCutaneous at bedtime  insulin lispro (ADMELOG) corrective regimen sliding scale   SubCutaneous three times a day before meals  metoprolol tartrate 100 milliGRAM(s) Oral two times a day  potassium chloride    Tablet ER 20 milliEquivalent(s) Oral every 2 hours  sacubitril 49 mG/valsartan 51 mG 1 Tablet(s) Oral two times a day  spironolactone 25 milliGRAM(s) Oral daily    MEDICATIONS  (PRN):  acetaminophen     Tablet .. 650 milliGRAM(s) Oral every 6 hours PRN Mild Pain (1 - 3), Moderate Pain (4 - 6)  dextrose Oral Gel 15 Gram(s) Oral once PRN Blood Glucose LESS THAN 70 milliGRAM(s)/deciliter  guaiFENesin Oral Liquid (Sugar-Free) 200 milliGRAM(s) Oral every 6 hours PRN Cough  melatonin 3 milliGRAM(s) Oral at bedtime PRN Insomnia      ALLERGIES:  Allergies    No Known Allergies    Intolerances        LABS:                        10.7   6.26  )-----------( 267      ( 05 Oct 2023 07:58 )             36.0     10-05    137  |  97  |  30<H>  ----------------------------<  142<H>  3.5   |  39<H>  |  0.92    Ca    7.6<L>      05 Oct 2023 07:58  Phos  3.3     10-05  Mg     2.0     10-05    TPro  7.4  /  Alb  3.2<L>  /  TBili  0.6  /  DBili  x   /  AST  39<H>  /  ALT  103<H>  /  AlkPhos  93  10-04      Urinalysis Basic - ( 05 Oct 2023 07:58 )    Color: x / Appearance: x / SG: x / pH: x  Gluc: 142 mg/dL / Ketone: x  / Bili: x / Urobili: x   Blood: x / Protein: x / Nitrite: x   Leuk Esterase: x / RBC: x / WBC x   Sq Epi: x / Non Sq Epi: x / Bacteria: x      ABG:  pH, Arterial: 7.37 (10-05-23 @ 05:00)  pCO2, Arterial: 67 mmHg (10-05-23 @ 05:00)  pO2, Arterial: 154 mmHg (10-05-23 @ 05:00)      Micro:    Culture - Blood (collected 10-02-23 @ 15:15)  Source: .Blood Blood-Peripheral  Preliminary Report (10-04-23 @ 20:01):    No growth at 48 Hours    Culture - Blood (collected 10-02-23 @ 15:00)  Source: .Blood Blood-Peripheral  Preliminary Report (10-04-23 @ 20:01):    No growth at 48 Hours          RADIOLOGY & ADDITIONAL TESTS: Reviewed.

## 2023-10-05 NOTE — PROGRESS NOTE ADULT - SUBJECTIVE AND OBJECTIVE BOX
[facility] Division of Hospital Medicine  Edie Monique MD  Available via MS Teams  Pager: [phone]    SUBJECTIVE / OVERNIGHT EVENTS:  - no events overnight, denies any nausea, vomiting, headaches, chest pain, abdominal pain, diarrhea. states that her cough and shortness of breath is improving now from before. able to situp and eat, tolerating her oral intake.     MEDICATIONS  (STANDING):  albuterol/ipratropium for Nebulization 3 milliLiter(s) Nebulizer every 6 hours  apixaban 5 milliGRAM(s) Oral every 12 hours  atorvastatin 20 milliGRAM(s) Oral at bedtime  benzonatate 100 milliGRAM(s) Oral every 8 hours  cefTRIAXone   IVPB 1000 milliGRAM(s) IV Intermittent every 24 hours  dextrose 5%. 1000 milliLiter(s) (50 mL/Hr) IV Continuous <Continuous>  dextrose 5%. 1000 milliLiter(s) (100 mL/Hr) IV Continuous <Continuous>  dextrose 50% Injectable 12.5 Gram(s) IV Push once  dextrose 50% Injectable 25 Gram(s) IV Push once  dextrose 50% Injectable 25 Gram(s) IV Push once  furosemide   Injectable 40 milliGRAM(s) IV Push two times a day  glucagon  Injectable 1 milliGRAM(s) IntraMuscular once  influenza  Vaccine (HIGH DOSE) 0.7 milliLiter(s) IntraMuscular once  insulin lispro (ADMELOG) corrective regimen sliding scale   SubCutaneous at bedtime  insulin lispro (ADMELOG) corrective regimen sliding scale   SubCutaneous three times a day before meals  metoprolol tartrate 100 milliGRAM(s) Oral two times a day  potassium chloride    Tablet ER 20 milliEquivalent(s) Oral every 2 hours  sacubitril 49 mG/valsartan 51 mG 1 Tablet(s) Oral two times a day  spironolactone 25 milliGRAM(s) Oral daily    MEDICATIONS  (PRN):  acetaminophen     Tablet .. 650 milliGRAM(s) Oral every 6 hours PRN Mild Pain (1 - 3), Moderate Pain (4 - 6)  dextrose Oral Gel 15 Gram(s) Oral once PRN Blood Glucose LESS THAN 70 milliGRAM(s)/deciliter  guaiFENesin Oral Liquid (Sugar-Free) 200 milliGRAM(s) Oral every 6 hours PRN Cough  melatonin 3 milliGRAM(s) Oral at bedtime PRN Insomnia      I&O's Summary    04 Oct 2023 07:01  -  05 Oct 2023 07:00  --------------------------------------------------------  IN: 220 mL / OUT: 500 mL / NET: -280 mL        PHYSICAL EXAM:  Vital Signs Last 24 Hrs  T(C): 36.8 (05 Oct 2023 11:07), Max: 37.2 (04 Oct 2023 16:20)  T(F): 98.2 (05 Oct 2023 11:07), Max: 99 (04 Oct 2023 16:20)  HR: 90 (05 Oct 2023 11:40) (78 - 106)  BP: 111/75 (05 Oct 2023 11:07) (111/75 - 143/81)  BP(mean): --  RR: 17 (05 Oct 2023 11:07) (17 - 18)  SpO2: 97% (05 Oct 2023 11:40) (95% - 100%)    Parameters below as of 05 Oct 2023 11:40  Patient On (Oxygen Delivery Method): nasal cannula      GENERAL: NAD, well-developed  HEAD:  Atraumatic, Normocephalic  EYES: EOMI, PERRLA, conjunctiva and sclera clear  NECK: Supple, No JVD  CHEST/LUNG: bilateral wheezing in upper and lower lung fields no crackles present   HEART: Regular rate and rhythm; No murmurs, rubs, or gallops  ABDOMEN: Soft, Nontender, Nondistended; Bowel sounds present  EXTREMITIES:  2+ Peripheral Pulses, No clubbing, cyanosis. 1+ pitting edema b/l up to knees   PSYCH: AAOx3, appropriate affect  NEUROLOGY: non-focal, deal  SKIN: No rashes or lesions    LABS:                        10.7   6.26  )-----------( 267      ( 05 Oct 2023 07:58 )             36.0     10-05    137  |  97  |  30<H>  ----------------------------<  142<H>  3.5   |  39<H>  |  0.92    Ca    7.6<L>      05 Oct 2023 07:58  Phos  3.3     10-05  Mg     2.0     10-05    TPro  7.4  /  Alb  3.2<L>  /  TBili  0.6  /  DBili  x   /  AST  39<H>  /  ALT  103<H>  /  AlkPhos  93  10-04          Urinalysis Basic - ( 05 Oct 2023 07:58 )    Color: x / Appearance: x / SG: x / pH: x  Gluc: 142 mg/dL / Ketone: x  / Bili: x / Urobili: x   Blood: x / Protein: x / Nitrite: x   Leuk Esterase: x / RBC: x / WBC x   Sq Epi: x / Non Sq Epi: x / Bacteria: x        Culture - Blood (collected 02 Oct 2023 15:15)  Source: .Blood Blood-Peripheral  Preliminary Report (04 Oct 2023 20:01):    No growth at 48 Hours    Culture - Blood (collected 02 Oct 2023 15:00)  Source: .Blood Blood-Peripheral  Preliminary Report (04 Oct 2023 20:01):    No growth at 48 Hours          RADIOLOGY & ADDITIONAL TESTS:  New Results Reviewed Today:   New Imaging Personally Reviewed Today:  New Electrocardiogram Personally Reviewed Today:  Prior or Outpatient Records Reviewed Today:    COMMUNICATION:  Care Discussed with Consultants/Other Providers and Details of Discussion:  Discussions with Patient/Family:  PCP Communication:

## 2023-10-05 NOTE — PROGRESS NOTE ADULT - NS ATTEND AMEND GEN_ALL_CORE FT
77F w/ HTN, ?COPD, CHF, Afib, LEIDY on CPAP. Presents w/ SOB and cough. Pt placed on BiPAP in ED, given steroids and diuretics. ABG showed mild acute on chronic hypercapnia and hypoxemia. POCUS performed at bedside revealed A line predominance w/ bibasilar consolidation patterns concerned for possible pneumonia vs pulmonary edema    - improving overall, seen sitting at side of bed; satting 95% on 4L, decreased to 3L NC   - wearing BiPAP overnight; she usually uses CPAP but BiPAP will help given some degree of chronic hypercapnia  - given chronic hypercapnia, pt will likely benefit from going home with BiPAP for nightly use  - continue IV diuretics for now in case of any volume overload contributing to hypoxemia  - continue ceftriaxone for CAP coverage, urine legionella negative can d/c azithromycin   - unclear hx of COPD vs asthma - pt never smoked, may have asthma not COPD, but no wheezing so will hold off on steroids; pt does not use any inhalers, except for albuterol as needed; continue duonebs  - will need pulmonary followup upon d/c - she has a pulmonologist that she sees on outside, did not recall name.
77F w/ HTN, ?COPD, CHF, Afib, LEIDY on CPAP. Presents w/ SOB and cough. Pt placed on BiPAP in ED, given steroids and diuretics. ABG showed mild acute on chronic hypercapnia and hypoxemia. POCUS performed at bedside revealed A line predominance w/ bibasilar consolidation patterns concerned for possible pneumonia vs pulmonary edema    - improving overall; remains on O2 this morning, 7LNC with sats in low 90s   - wore BiPAP overnight; she usually uses CPAP but BiPAP will help given some degree of chronic hypercapnia  - can continue IV diuretics for now in case of any volume overload contributing to hypoxemia  - continue CAP coverage for possible pneumonia; check urine legionella   - unclear hx of COPD vs asthma - pt never smoked, may have asthma not COPD, but no wheezing so will hold off on steroids; pt does not use any inhalers, except for albuterol as needed; continue duonebs  - will need pulmonary followup upon d/c - she has a pulmonologist that she sees on outside, did not recall name

## 2023-10-05 NOTE — PROGRESS NOTE ADULT - ASSESSMENT
78 years old female with history of HTN, HLD, CHF, A. fib, Morbid obesity, COPD, LEIDY presented w/ dyspnea due to acute respiratory failure with hypoxia and hypercapnia due to acute on chronic systolic congestive heart failure and CAP. Pt was also found to be in Afib with RVR. Pt required BiPAP.      Acute on chronic systolic congestive heart failure    - CXR with pulmonary congestion   - CT chest showed ground-glass opacities likely due to pulmonary edema in the setting of mild septal thickening, small pleural effusions and cardiomegaly. mosaic lung parenchyma with suspected air trapping.  - patient says she missed Lasix for a few days  - c/w IV Lasix 40mg bid, hope to transition to po 10/6  - ECHO showed EF 45-50%, moderate tricuspid regurgitation with moderate pulmonary hypertension  - c/w telemetry    - Monitor I/O & daily weight  - c/w Entresto, metoprolol, aldactone & Lipitor  - c/w BiPAP QHS    CAP  - c/w Ceftriaxone, azithromycin for possible underling pneumonia  - respiratory PCR negative, pending: urine legionella Ag negative ( azithromycin dc'd), strep pneumo Ag  - Pul note read and appreciated   - c/w Duoneb q6h standing for now, improving     Atrial fibrillation with rapid ventricular response   - rate controlled w/ metoprolol    - ECHO showed EF 45-50%, moderate tricuspid regurgitation with moderate pulmonary hypertension  - c/w telemetry monitoring  - c/w Apixaban       Hyperglycemia  - c/w ISS and hypoglycemia protocol  - A1C is 5.6    Euthyroid sick syndrome  - TSH is 0.133, free thyroxine is 1.2     Ill-defined 4 mm nodule at the left apex   - will need to get follow up scan once patient improves    HTN  - c/w metoprolol     Hyperlipidemia  - c/w Lipitor    Prophylaxis:  GI: PO diet  DVT: Apixaban   Dispo: PT consulted.  Daughter updated over the phone 10/5

## 2023-10-06 LAB
ANION GAP SERPL CALC-SCNC: 4 MMOL/L — LOW (ref 5–17)
BUN SERPL-MCNC: 24 MG/DL — HIGH (ref 7–23)
CALCIUM SERPL-MCNC: 8.1 MG/DL — LOW (ref 8.5–10.1)
CHLORIDE SERPL-SCNC: 96 MMOL/L — SIGNIFICANT CHANGE UP (ref 96–108)
CO2 SERPL-SCNC: 40 MMOL/L — HIGH (ref 22–31)
CREAT SERPL-MCNC: 0.91 MG/DL — SIGNIFICANT CHANGE UP (ref 0.5–1.3)
EGFR: 65 ML/MIN/1.73M2 — SIGNIFICANT CHANGE UP
GLUCOSE BLDC GLUCOMTR-MCNC: 136 MG/DL — HIGH (ref 70–99)
GLUCOSE BLDC GLUCOMTR-MCNC: 146 MG/DL — HIGH (ref 70–99)
GLUCOSE BLDC GLUCOMTR-MCNC: 168 MG/DL — HIGH (ref 70–99)
GLUCOSE BLDC GLUCOMTR-MCNC: 179 MG/DL — HIGH (ref 70–99)
GLUCOSE SERPL-MCNC: 126 MG/DL — HIGH (ref 70–99)
HCT VFR BLD CALC: 34.3 % — LOW (ref 34.5–45)
HGB BLD-MCNC: 10.5 G/DL — LOW (ref 11.5–15.5)
MAGNESIUM SERPL-MCNC: 2.1 MG/DL — SIGNIFICANT CHANGE UP (ref 1.6–2.6)
MCHC RBC-ENTMCNC: 30.6 G/DL — LOW (ref 32–36)
MCHC RBC-ENTMCNC: 31.4 PG — SIGNIFICANT CHANGE UP (ref 27–34)
MCV RBC AUTO: 102.7 FL — HIGH (ref 80–100)
NRBC # BLD: 0 /100 WBCS — SIGNIFICANT CHANGE UP (ref 0–0)
PHOSPHATE SERPL-MCNC: 3.1 MG/DL — SIGNIFICANT CHANGE UP (ref 2.5–4.5)
PLATELET # BLD AUTO: 261 K/UL — SIGNIFICANT CHANGE UP (ref 150–400)
POTASSIUM SERPL-MCNC: 4 MMOL/L — SIGNIFICANT CHANGE UP (ref 3.5–5.3)
POTASSIUM SERPL-SCNC: 4 MMOL/L — SIGNIFICANT CHANGE UP (ref 3.5–5.3)
RBC # BLD: 3.34 M/UL — LOW (ref 3.8–5.2)
RBC # FLD: 12.4 % — SIGNIFICANT CHANGE UP (ref 10.3–14.5)
SODIUM SERPL-SCNC: 140 MMOL/L — SIGNIFICANT CHANGE UP (ref 135–145)
WBC # BLD: 6.6 K/UL — SIGNIFICANT CHANGE UP (ref 3.8–10.5)
WBC # FLD AUTO: 6.6 K/UL — SIGNIFICANT CHANGE UP (ref 3.8–10.5)

## 2023-10-06 PROCEDURE — 99233 SBSQ HOSP IP/OBS HIGH 50: CPT

## 2023-10-06 RX ORDER — FUROSEMIDE 40 MG
40 TABLET ORAL
Refills: 0 | Status: DISCONTINUED | OUTPATIENT
Start: 2023-10-06 | End: 2023-10-16

## 2023-10-06 RX ADMIN — Medication 3 MILLILITER(S): at 23:03

## 2023-10-06 RX ADMIN — APIXABAN 5 MILLIGRAM(S): 2.5 TABLET, FILM COATED ORAL at 05:51

## 2023-10-06 RX ADMIN — ATORVASTATIN CALCIUM 20 MILLIGRAM(S): 80 TABLET, FILM COATED ORAL at 22:32

## 2023-10-06 RX ADMIN — SACUBITRIL AND VALSARTAN 1 TABLET(S): 24; 26 TABLET, FILM COATED ORAL at 05:51

## 2023-10-06 RX ADMIN — Medication 100 MILLIGRAM(S): at 05:51

## 2023-10-06 RX ADMIN — Medication 40 MILLIGRAM(S): at 14:59

## 2023-10-06 RX ADMIN — SPIRONOLACTONE 25 MILLIGRAM(S): 25 TABLET, FILM COATED ORAL at 05:51

## 2023-10-06 RX ADMIN — Medication 1: at 17:13

## 2023-10-06 RX ADMIN — Medication 100 MILLIGRAM(S): at 17:14

## 2023-10-06 RX ADMIN — Medication 100 MILLIGRAM(S): at 22:32

## 2023-10-06 RX ADMIN — Medication 100 MILLIGRAM(S): at 05:52

## 2023-10-06 RX ADMIN — Medication 3 MILLILITER(S): at 17:09

## 2023-10-06 RX ADMIN — Medication 3 MILLILITER(S): at 05:26

## 2023-10-06 RX ADMIN — Medication 100 MILLIGRAM(S): at 14:59

## 2023-10-06 RX ADMIN — Medication 3 MILLILITER(S): at 11:38

## 2023-10-06 RX ADMIN — Medication 1: at 11:37

## 2023-10-06 RX ADMIN — Medication 40 MILLIGRAM(S): at 05:51

## 2023-10-06 RX ADMIN — APIXABAN 5 MILLIGRAM(S): 2.5 TABLET, FILM COATED ORAL at 17:14

## 2023-10-06 RX ADMIN — CEFTRIAXONE 100 MILLIGRAM(S): 500 INJECTION, POWDER, FOR SOLUTION INTRAMUSCULAR; INTRAVENOUS at 11:39

## 2023-10-06 NOTE — PROGRESS NOTE ADULT - NS ATTEST RISK PROBLEM GEN_ALL_CORE FT
morbid obesity, respiratory distress, new BiPAP use, possible pnuemonia vs pulmonary edema.

## 2023-10-06 NOTE — PHYSICAL THERAPY INITIAL EVALUATION ADULT - PERTINENT HX OF CURRENT PROBLEM, REHAB EVAL
As per H&P notes "78 years old female with h/o HTN, HLD, CHF, Afib, obesity, asthma, LEIDY present to ED with complain of SOB. Patient reported SOB for 1 day, associated with orthopnea. Patient also feel chest congestion with cough for 1 day".

## 2023-10-06 NOTE — PHYSICAL THERAPY INITIAL EVALUATION ADULT - IMPAIRMENTS FOUND, PT EVAL
aerobic capacity/endurance/gait, locomotion, and balance/muscle strength/poor safety awareness/ventilation and respiration/gas exchange

## 2023-10-06 NOTE — PROGRESS NOTE ADULT - ASSESSMENT
78 years old female with history of HTN, HLD, CHF, A. fib, Morbid obesity, COPD, LEIDY presented w/ dyspnea due to acute respiratory failure with hypoxia and hypercapnia due to acute on chronic systolic congestive heart failure and CAP, initially requiring BIPAP but now on 0-2L NC. Pt was also found to be in Afib with RVR. .      Acute on chronic systolic congestive heart failure    - CXR with pulmonary congestion   - CT chest showed ground-glass opacities likely due to pulmonary edema in the setting of mild septal thickening, small pleural effusions and cardiomegaly. mosaic lung parenchyma with suspected air trapping.  - patient says she missed Lasix for a few days  - ECHO showed EF 45-50%, moderate tricuspid regurgitation with moderate pulmonary hypertension  - has been on IV Lasix 40mg bid will switch to po lasix today (40 BID)  - c/w telemetry    - Monitor I/O & daily weight  - c/w Entresto, metoprolol, aldactone & Lipitor  - c/w BiPAP QHS    CAP  - c/w Ceftriaxone, azithromycin for possible underling pneumonia  - respiratory PCR negative, pending: urine legionella Ag negative ( azithromycin dc'd), strep pneumo Ag  - Pulm note read and appreciated   - c/w Duoneb q6h standing for now, improving respiratory status  - desaturates to 85% while eating, but otherwise able to maintain saturations >90% on RA (as of 10/6)    Atrial fibrillation with rapid ventricular response   - rate controlled w/ metoprolol    - ECHO showed EF 45-50%, moderate tricuspid regurgitation with moderate pulmonary hypertension  - c/w telemetry monitoring  - c/w Apixaban       Hyperglycemia  - c/w ISS and hypoglycemia protocol  - A1C is 5.6    Euthyroid sick syndrome  - TSH is 0.133, free thyroxine is 1.2     Ill-defined 4 mm nodule at the left apex   - will need to get follow up scan once patient improves    HTN  - c/w metoprolol     Hyperlipidemia  - c/w Lipitor    Prophylaxis:  GI: PO diet  DVT: Apixaban   Dispo: PT consulted.  Daughter updated over the phone 10/5

## 2023-10-06 NOTE — PHYSICAL THERAPY INITIAL EVALUATION ADULT - PRECAUTIONS/LIMITATIONS, REHAB EVAL
Pt was desturating in RA( pt baseline no home O2, at rest SO2 was 98 in RA but after PT session SO2 was 85/fall precautions/oxygen therapy device and L/min

## 2023-10-06 NOTE — PHYSICAL THERAPY INITIAL EVALUATION ADULT - ADDITIONAL COMMENTS
As per pt, she lives in a house with 3 flight of stairs with 1 rail up, at baseline no home O2. She was independent in all functional mobility using a cane

## 2023-10-06 NOTE — PROGRESS NOTE ADULT - ASSESSMENT
77F w/ HTN, ?COPD, CHF, Afib, LEIDY on CPAP. Presents w/ SOB and cough. Pt placed on BiPAP in ED, given steroids and diuretics. ABG showed mild acute on chronic hypercapnia and hypoxemia. POCUS performed at bedside revealed A line predominance w/ bibasilar consolidation patterns concerned for possible pneumonia vs pulmonary edema    - improving overall, seen sitting at side of bed; satting 97% on 2L  - wearing BiPAP overnight; she usually uses CPAP but BiPAP will help given some degree of chronic hypercapnia  - given chronic hypercapnia, pt will likely benefit from going home with BiPAP for nightly use  - continue IV diuretics for now in case of any volume overload contributing to hypoxemia  - continue ceftriaxone for CAP coverage  - unclear hx of COPD vs asthma - pt never smoked, may have asthma not COPD, but no wheezing so will hold off on steroids; pt does not use any inhalers, except for albuterol as needed; continue duonebs  - will need pulmonary followup upon d/c - she has a pulmonologist that she sees on outside, did not recall name.

## 2023-10-06 NOTE — PROGRESS NOTE ADULT - SUBJECTIVE AND OBJECTIVE BOX
[facility] Division of Hospital Medicine  Edie Monique MD  Available via MS Teams  Pager: [phone]    SUBJECTIVE / OVERNIGHT EVENTS:  - no events overnight, denies any nausea, vomiting, headaches. states she is able to breath much better today. States her cough is still present but improving. Had a good nights worth of sleep. Denies any abdominal pain, diarrhea, constipation.     MEDICATIONS  (STANDING):  albuterol/ipratropium for Nebulization 3 milliLiter(s) Nebulizer every 6 hours  apixaban 5 milliGRAM(s) Oral every 12 hours  atorvastatin 20 milliGRAM(s) Oral at bedtime  benzonatate 100 milliGRAM(s) Oral every 8 hours  cefTRIAXone   IVPB 1000 milliGRAM(s) IV Intermittent every 24 hours  dextrose 5%. 1000 milliLiter(s) (50 mL/Hr) IV Continuous <Continuous>  dextrose 5%. 1000 milliLiter(s) (100 mL/Hr) IV Continuous <Continuous>  dextrose 50% Injectable 12.5 Gram(s) IV Push once  dextrose 50% Injectable 25 Gram(s) IV Push once  dextrose 50% Injectable 25 Gram(s) IV Push once  furosemide    Tablet 40 milliGRAM(s) Oral two times a day  glucagon  Injectable 1 milliGRAM(s) IntraMuscular once  influenza  Vaccine (HIGH DOSE) 0.7 milliLiter(s) IntraMuscular once  insulin lispro (ADMELOG) corrective regimen sliding scale   SubCutaneous three times a day before meals  insulin lispro (ADMELOG) corrective regimen sliding scale   SubCutaneous at bedtime  metoprolol tartrate 100 milliGRAM(s) Oral two times a day  sacubitril 49 mG/valsartan 51 mG 1 Tablet(s) Oral two times a day  spironolactone 25 milliGRAM(s) Oral daily    MEDICATIONS  (PRN):  acetaminophen     Tablet .. 650 milliGRAM(s) Oral every 6 hours PRN Mild Pain (1 - 3), Moderate Pain (4 - 6)  dextrose Oral Gel 15 Gram(s) Oral once PRN Blood Glucose LESS THAN 70 milliGRAM(s)/deciliter  guaiFENesin Oral Liquid (Sugar-Free) 200 milliGRAM(s) Oral every 6 hours PRN Cough  melatonin 3 milliGRAM(s) Oral at bedtime PRN Insomnia      I&O's Summary    05 Oct 2023 07:01  -  06 Oct 2023 07:00  --------------------------------------------------------  IN: 50 mL / OUT: 2900 mL / NET: -2850 mL        PHYSICAL EXAM:  Vital Signs Last 24 Hrs  T(C): 36.7 (06 Oct 2023 10:12), Max: 37 (05 Oct 2023 16:36)  T(F): 98 (06 Oct 2023 10:12), Max: 98.6 (05 Oct 2023 16:36)  HR: 105 (06 Oct 2023 10:12) (83 - 109)  BP: 114/89 (06 Oct 2023 10:12) (114/89 - 131/88)  BP(mean): --  RR: 18 (06 Oct 2023 10:12) (18 - 19)  SpO2: 98% (06 Oct 2023 10:12) (93% - 100%)    Parameters below as of 06 Oct 2023 06:35  Patient On (Oxygen Delivery Method): nasal cannula      GENERAL: NAD, well-developed  HEAD:  Atraumatic, Normocephalic  EYES: EOMI, PERRLA, conjunctiva and sclera clear  NECK: Supple, No JVD  CHEST/LUNG: bilateral wheezing inlower lung fields no crackles present   HEART: Regular rate and rhythm; No murmurs, rubs, or gallops  ABDOMEN: Soft, Nontender, Nondistended; Bowel sounds present  EXTREMITIES:  2+ Peripheral Pulses, No clubbing, cyanosis. trace pitting edema on the ankles   PSYCH: AAOx3, appropriate affect  NEUROLOGY: non-focal, deal  SKIN: No rashes or lesions    LABS:                        10.5   6.60  )-----------( 261      ( 06 Oct 2023 08:18 )             34.3     10-06    140  |  96  |  24<H>  ----------------------------<  126<H>  4.0   |  40<H>  |  0.91    Ca    8.1<L>      06 Oct 2023 08:18  Phos  3.1     10-06  Mg     2.1     10-06            Urinalysis Basic - ( 06 Oct 2023 08:18 )    Color: x / Appearance: x / SG: x / pH: x  Gluc: 126 mg/dL / Ketone: x  / Bili: x / Urobili: x   Blood: x / Protein: x / Nitrite: x   Leuk Esterase: x / RBC: x / WBC x   Sq Epi: x / Non Sq Epi: x / Bacteria: x            RADIOLOGY & ADDITIONAL TESTS:  New Results Reviewed Today: cbc bmp mg phos  New Imaging Personally Reviewed Today:  New Electrocardiogram Personally Reviewed Today:  Prior or Outpatient Records Reviewed Today:    COMMUNICATION:  Care Discussed with Consultants/Other Providers and Details of Discussion:  Discussions with Patient/Family:  PCP Communication:

## 2023-10-06 NOTE — PHYSICAL THERAPY INITIAL EVALUATION ADULT - TRANSFER TRAINING, PT EVAL
Pt will be able to perform sit to stand, stand pivot transfer using [RW]  independently in 2 to 3 weeks

## 2023-10-06 NOTE — PROGRESS NOTE ADULT - SUBJECTIVE AND OBJECTIVE BOX
Interval Events:  no o/n events  wearing BiPAP overnight   remains on 2L NC    REVIEW OF SYSTEMS:  Constitutional: No fevers or chills. No weight loss. No fatigue or generalised malaise.  Eyes: No itching or discharge from the eyes  ENT: No ear pain. No ear discharge. No nasal congestion. No post nasal drip. No epistaxis. No throat pain. No sore throat. No difficulty swallowing.   CV: No chest pain. No palpitations. No lightheadedness or dizziness.   Resp: No dyspnea at rest. No dyspnea on exertion. No orthopnea. No wheezing. No cough. No stridor. No sputum production. No chest pain with respiration.  GI: No nausea. No vomiting. No diarrhea.  MSK: No joint pain or pain in any extremities  Integumentary: No skin lesions. No pedal edema.  Neurological: No gross motor weakness. No sensory changes.  [ x] All other systems negative    OBJECTIVE:  ICU Vital Signs Last 24 Hrs  T(C): 36.5 (06 Oct 2023 05:05), Max: 37 (05 Oct 2023 16:36)  T(F): 97.7 (06 Oct 2023 05:05), Max: 98.6 (05 Oct 2023 16:36)  HR: 83 (06 Oct 2023 09:02) (83 - 109)  BP: 126/74 (06 Oct 2023 05:05) (111/75 - 131/88)  BP(mean): --  ABP: --  ABP(mean): --  RR: 18 (06 Oct 2023 05:05) (17 - 19)  SpO2: 97% (06 Oct 2023 09:02) (93% - 100%)    O2 Parameters below as of 06 Oct 2023 06:35  Patient On (Oxygen Delivery Method): nasal cannula              10-05 @ 07:01  -  10-06 @ 07:00  --------------------------------------------------------  IN: 50 mL / OUT: 2900 mL / NET: -2850 mL      CAPILLARY BLOOD GLUCOSE      POCT Blood Glucose.: 136 mg/dL (06 Oct 2023 07:30)      PHYSICAL EXAM:  GENERAL: NAD,  sitting on edge of bed comfortably   HEAD:  Atraumatic, normocephalic  EYES: EOMI, PERRL  NECK: Supple, trachea midline, no JVD  HEART: Regular rate and rhythm  LUNGS: Unlabored respirations. mild end expiratory wheezing  ABDOMEN: Soft, nontender, nondistended, +BS  EXTREMITIES: 2+ peripheral pulses bilaterally, cap refill<2 secs. 1+ LE edema bilat   NERVOUS SYSTEM:  A&Ox3, following commands, moving all extremities, no focal deficit    HOSPITAL MEDICATIONS:  MEDICATIONS  (STANDING):  albuterol/ipratropium for Nebulization 3 milliLiter(s) Nebulizer every 6 hours  apixaban 5 milliGRAM(s) Oral every 12 hours  atorvastatin 20 milliGRAM(s) Oral at bedtime  benzonatate 100 milliGRAM(s) Oral every 8 hours  cefTRIAXone   IVPB 1000 milliGRAM(s) IV Intermittent every 24 hours  dextrose 5%. 1000 milliLiter(s) (50 mL/Hr) IV Continuous <Continuous>  dextrose 5%. 1000 milliLiter(s) (100 mL/Hr) IV Continuous <Continuous>  dextrose 50% Injectable 12.5 Gram(s) IV Push once  dextrose 50% Injectable 25 Gram(s) IV Push once  dextrose 50% Injectable 25 Gram(s) IV Push once  furosemide   Injectable 40 milliGRAM(s) IV Push two times a day  glucagon  Injectable 1 milliGRAM(s) IntraMuscular once  influenza  Vaccine (HIGH DOSE) 0.7 milliLiter(s) IntraMuscular once  insulin lispro (ADMELOG) corrective regimen sliding scale   SubCutaneous three times a day before meals  insulin lispro (ADMELOG) corrective regimen sliding scale   SubCutaneous at bedtime  metoprolol tartrate 100 milliGRAM(s) Oral two times a day  sacubitril 49 mG/valsartan 51 mG 1 Tablet(s) Oral two times a day  spironolactone 25 milliGRAM(s) Oral daily    MEDICATIONS  (PRN):  acetaminophen     Tablet .. 650 milliGRAM(s) Oral every 6 hours PRN Mild Pain (1 - 3), Moderate Pain (4 - 6)  dextrose Oral Gel 15 Gram(s) Oral once PRN Blood Glucose LESS THAN 70 milliGRAM(s)/deciliter  guaiFENesin Oral Liquid (Sugar-Free) 200 milliGRAM(s) Oral every 6 hours PRN Cough  melatonin 3 milliGRAM(s) Oral at bedtime PRN Insomnia      LABS:                        10.5   6.60  )-----------( 261      ( 06 Oct 2023 08:18 )             34.3     Hgb Trend: 10.5<--, 10.7<--, 10.7<--, 10.8<--, 11.4<--  10-06    140  |  96  |  24<H>  ----------------------------<  126<H>  4.0   |  40<H>  |  0.91    Ca    8.1<L>      06 Oct 2023 08:18  Phos  3.1     10-06  Mg     2.1     10-06        Urinalysis Basic - ( 06 Oct 2023 08:18 )    Color: x / Appearance: x / SG: x / pH: x  Gluc: 126 mg/dL / Ketone: x  / Bili: x / Urobili: x   Blood: x / Protein: x / Nitrite: x   Leuk Esterase: x / RBC: x / WBC x   Sq Epi: x / Non Sq Epi: x / Bacteria: x      Arterial Blood Gas:  10-05 @ 05:00  7.37/67/154/39/99.2/11.1  ABG lactate: --        MICROBIOLOGY:     RADIOLOGY:  [ ] Reviewed and interpreted by me    Bedside US:

## 2023-10-07 LAB
ANION GAP SERPL CALC-SCNC: 6 MMOL/L — SIGNIFICANT CHANGE UP (ref 5–17)
BUN SERPL-MCNC: 26 MG/DL — HIGH (ref 7–23)
CALCIUM SERPL-MCNC: 8.3 MG/DL — LOW (ref 8.5–10.1)
CHLORIDE SERPL-SCNC: 99 MMOL/L — SIGNIFICANT CHANGE UP (ref 96–108)
CO2 SERPL-SCNC: 36 MMOL/L — HIGH (ref 22–31)
CREAT SERPL-MCNC: 0.94 MG/DL — SIGNIFICANT CHANGE UP (ref 0.5–1.3)
CULTURE RESULTS: SIGNIFICANT CHANGE UP
CULTURE RESULTS: SIGNIFICANT CHANGE UP
EGFR: 63 ML/MIN/1.73M2 — SIGNIFICANT CHANGE UP
GLUCOSE BLDC GLUCOMTR-MCNC: 101 MG/DL — HIGH (ref 70–99)
GLUCOSE BLDC GLUCOMTR-MCNC: 124 MG/DL — HIGH (ref 70–99)
GLUCOSE BLDC GLUCOMTR-MCNC: 146 MG/DL — HIGH (ref 70–99)
GLUCOSE BLDC GLUCOMTR-MCNC: 228 MG/DL — HIGH (ref 70–99)
GLUCOSE SERPL-MCNC: 121 MG/DL — HIGH (ref 70–99)
HCT VFR BLD CALC: 36.7 % — SIGNIFICANT CHANGE UP (ref 34.5–45)
HGB BLD-MCNC: 11.1 G/DL — LOW (ref 11.5–15.5)
MAGNESIUM SERPL-MCNC: 2.2 MG/DL — SIGNIFICANT CHANGE UP (ref 1.6–2.6)
MCHC RBC-ENTMCNC: 30.2 G/DL — LOW (ref 32–36)
MCHC RBC-ENTMCNC: 31.4 PG — SIGNIFICANT CHANGE UP (ref 27–34)
MCV RBC AUTO: 104 FL — HIGH (ref 80–100)
NRBC # BLD: 0 /100 WBCS — SIGNIFICANT CHANGE UP (ref 0–0)
PHOSPHATE SERPL-MCNC: 3.5 MG/DL — SIGNIFICANT CHANGE UP (ref 2.5–4.5)
PLATELET # BLD AUTO: 270 K/UL — SIGNIFICANT CHANGE UP (ref 150–400)
POTASSIUM SERPL-MCNC: 4.4 MMOL/L — SIGNIFICANT CHANGE UP (ref 3.5–5.3)
POTASSIUM SERPL-SCNC: 4.4 MMOL/L — SIGNIFICANT CHANGE UP (ref 3.5–5.3)
RBC # BLD: 3.53 M/UL — LOW (ref 3.8–5.2)
RBC # FLD: 12.3 % — SIGNIFICANT CHANGE UP (ref 10.3–14.5)
SODIUM SERPL-SCNC: 141 MMOL/L — SIGNIFICANT CHANGE UP (ref 135–145)
SPECIMEN SOURCE: SIGNIFICANT CHANGE UP
SPECIMEN SOURCE: SIGNIFICANT CHANGE UP
WBC # BLD: 6.67 K/UL — SIGNIFICANT CHANGE UP (ref 3.8–10.5)
WBC # FLD AUTO: 6.67 K/UL — SIGNIFICANT CHANGE UP (ref 3.8–10.5)

## 2023-10-07 PROCEDURE — 99232 SBSQ HOSP IP/OBS MODERATE 35: CPT

## 2023-10-07 RX ORDER — DILTIAZEM HCL 120 MG
30 CAPSULE, EXT RELEASE 24 HR ORAL
Refills: 0 | Status: DISCONTINUED | OUTPATIENT
Start: 2023-10-07 | End: 2023-10-16

## 2023-10-07 RX ORDER — SENNA PLUS 8.6 MG/1
2 TABLET ORAL AT BEDTIME
Refills: 0 | Status: DISCONTINUED | OUTPATIENT
Start: 2023-10-07 | End: 2023-10-16

## 2023-10-07 RX ADMIN — Medication 3 MILLILITER(S): at 11:13

## 2023-10-07 RX ADMIN — SACUBITRIL AND VALSARTAN 1 TABLET(S): 24; 26 TABLET, FILM COATED ORAL at 05:42

## 2023-10-07 RX ADMIN — Medication 100 MILLIGRAM(S): at 17:07

## 2023-10-07 RX ADMIN — Medication 100 MILLIGRAM(S): at 13:09

## 2023-10-07 RX ADMIN — SENNA PLUS 2 TABLET(S): 8.6 TABLET ORAL at 21:40

## 2023-10-07 RX ADMIN — Medication 3 MILLILITER(S): at 17:07

## 2023-10-07 RX ADMIN — Medication 2: at 10:52

## 2023-10-07 RX ADMIN — Medication 100 MILLIGRAM(S): at 05:42

## 2023-10-07 RX ADMIN — APIXABAN 5 MILLIGRAM(S): 2.5 TABLET, FILM COATED ORAL at 17:08

## 2023-10-07 RX ADMIN — Medication 40 MILLIGRAM(S): at 07:42

## 2023-10-07 RX ADMIN — Medication 200 MILLIGRAM(S): at 13:09

## 2023-10-07 RX ADMIN — Medication 3 MILLILITER(S): at 05:28

## 2023-10-07 RX ADMIN — SPIRONOLACTONE 25 MILLIGRAM(S): 25 TABLET, FILM COATED ORAL at 05:42

## 2023-10-07 RX ADMIN — SACUBITRIL AND VALSARTAN 1 TABLET(S): 24; 26 TABLET, FILM COATED ORAL at 17:07

## 2023-10-07 RX ADMIN — Medication 3 MILLILITER(S): at 23:22

## 2023-10-07 RX ADMIN — APIXABAN 5 MILLIGRAM(S): 2.5 TABLET, FILM COATED ORAL at 05:42

## 2023-10-07 RX ADMIN — Medication 40 MILLIGRAM(S): at 13:09

## 2023-10-07 RX ADMIN — ATORVASTATIN CALCIUM 20 MILLIGRAM(S): 80 TABLET, FILM COATED ORAL at 21:40

## 2023-10-07 RX ADMIN — Medication 100 MILLIGRAM(S): at 21:40

## 2023-10-07 NOTE — PROGRESS NOTE ADULT - TIME BILLING
min spent reviewing patient's chart,  examining patient, discussing plan with patient and family and staff, reviewing consultant recommendations/communicating with consultants, writing progress note and placing orders.

## 2023-10-07 NOTE — PROGRESS NOTE ADULT - SUBJECTIVE AND OBJECTIVE BOX
SUBJECTIVE / OVERNIGHT EVENTS:  - no events overnight, denies any nausea, vomiting, headaches. states she is able to breath much better today. States her cough is still present but improving. Had a good nights worth of sleep. Denies any abdominal pain, diarrhea, constipation.     MEDICATIONS  (STANDING):  albuterol/ipratropium for Nebulization 3 milliLiter(s) Nebulizer every 6 hours  apixaban 5 milliGRAM(s) Oral every 12 hours  atorvastatin 20 milliGRAM(s) Oral at bedtime  benzonatate 100 milliGRAM(s) Oral every 8 hours  cefTRIAXone   IVPB 1000 milliGRAM(s) IV Intermittent every 24 hours  dextrose 5%. 1000 milliLiter(s) (50 mL/Hr) IV Continuous <Continuous>  dextrose 5%. 1000 milliLiter(s) (100 mL/Hr) IV Continuous <Continuous>  dextrose 50% Injectable 12.5 Gram(s) IV Push once  dextrose 50% Injectable 25 Gram(s) IV Push once  dextrose 50% Injectable 25 Gram(s) IV Push once  furosemide    Tablet 40 milliGRAM(s) Oral two times a day  glucagon  Injectable 1 milliGRAM(s) IntraMuscular once  influenza  Vaccine (HIGH DOSE) 0.7 milliLiter(s) IntraMuscular once  insulin lispro (ADMELOG) corrective regimen sliding scale   SubCutaneous three times a day before meals  insulin lispro (ADMELOG) corrective regimen sliding scale   SubCutaneous at bedtime  metoprolol tartrate 100 milliGRAM(s) Oral two times a day  sacubitril 49 mG/valsartan 51 mG 1 Tablet(s) Oral two times a day  spironolactone 25 milliGRAM(s) Oral daily    MEDICATIONS  (PRN):  acetaminophen     Tablet .. 650 milliGRAM(s) Oral every 6 hours PRN Mild Pain (1 - 3), Moderate Pain (4 - 6)  dextrose Oral Gel 15 Gram(s) Oral once PRN Blood Glucose LESS THAN 70 milliGRAM(s)/deciliter  guaiFENesin Oral Liquid (Sugar-Free) 200 milliGRAM(s) Oral every 6 hours PRN Cough  melatonin 3 milliGRAM(s) Oral at bedtime PRN Insomnia      I&O's Summary    05 Oct 2023 07:01  -  06 Oct 2023 07:00  --------------------------------------------------------  IN: 50 mL / OUT: 2900 mL / NET: -2850 mL        PHYSICAL EXAM:  Vital Signs Last 24 Hrs  T(C): 36.7 (06 Oct 2023 10:12), Max: 37 (05 Oct 2023 16:36)  T(F): 98 (06 Oct 2023 10:12), Max: 98.6 (05 Oct 2023 16:36)  HR: 105 (06 Oct 2023 10:12) (83 - 109)  BP: 114/89 (06 Oct 2023 10:12) (114/89 - 131/88)  BP(mean): --  RR: 18 (06 Oct 2023 10:12) (18 - 19)  SpO2: 98% (06 Oct 2023 10:12) (93% - 100%)    Parameters below as of 06 Oct 2023 06:35  Patient On (Oxygen Delivery Method): nasal cannula      GENERAL: NAD, well-developed  HEAD:  Atraumatic, Normocephalic  EYES: EOMI, PERRLA, conjunctiva and sclera clear  NECK: Supple, No JVD  CHEST/LUNG: bilateral wheezing inlower lung fields no crackles present   HEART: Regular rate and rhythm; No murmurs, rubs, or gallops  ABDOMEN: Soft, Nontender, Nondistended; Bowel sounds present  EXTREMITIES:  2+ Peripheral Pulses, No clubbing, cyanosis. trace pitting edema on the ankles   PSYCH: AAOx3, appropriate affect  NEUROLOGY: non-focal, deal  SKIN: No rashes or lesions    LABS:                        10.5   6.60  )-----------( 261      ( 06 Oct 2023 08:18 )             34.3     10-06    140  |  96  |  24<H>  ----------------------------<  126<H>  4.0   |  40<H>  |  0.91    Ca    8.1<L>      06 Oct 2023 08:18  Phos  3.1     10-06  Mg     2.1     10-06            Urinalysis Basic - ( 06 Oct 2023 08:18 )    Color: x / Appearance: x / SG: x / pH: x  Gluc: 126 mg/dL / Ketone: x  / Bili: x / Urobili: x   Blood: x / Protein: x / Nitrite: x   Leuk Esterase: x / RBC: x / WBC x   Sq Epi: x / Non Sq Epi: x / Bacteria: x            RADIOLOGY & ADDITIONAL TESTS:  New Results Reviewed Today: cbc bmp mg phos  New Imaging Personally Reviewed Today:  New Electrocardiogram Personally Reviewed Today:  Prior or Outpatient Records Reviewed Today:    COMMUNICATION:  Care Discussed with Consultants/Other Providers and Details of Discussion:  Discussions with Patient/Family:  PCP Communication:       SUBJECTIVE / OVERNIGHT EVENTS:  Patient seen and examined bedside.   no overnight events     REVIEW OF SYSTEMS: remaining ROS negative     MEDICATIONS  (STANDING):  albuterol/ipratropium for Nebulization 3 milliLiter(s) Nebulizer every 6 hours  apixaban 5 milliGRAM(s) Oral every 12 hours  atorvastatin 20 milliGRAM(s) Oral at bedtime  benzonatate 100 milliGRAM(s) Oral every 8 hours  cefTRIAXone   IVPB 1000 milliGRAM(s) IV Intermittent every 24 hours  dextrose 5%. 1000 milliLiter(s) (50 mL/Hr) IV Continuous <Continuous>  dextrose 5%. 1000 milliLiter(s) (100 mL/Hr) IV Continuous <Continuous>  dextrose 50% Injectable 12.5 Gram(s) IV Push once  dextrose 50% Injectable 25 Gram(s) IV Push once  dextrose 50% Injectable 25 Gram(s) IV Push once  furosemide    Tablet 40 milliGRAM(s) Oral two times a day  glucagon  Injectable 1 milliGRAM(s) IntraMuscular once  influenza  Vaccine (HIGH DOSE) 0.7 milliLiter(s) IntraMuscular once  insulin lispro (ADMELOG) corrective regimen sliding scale   SubCutaneous three times a day before meals  insulin lispro (ADMELOG) corrective regimen sliding scale   SubCutaneous at bedtime  metoprolol tartrate 100 milliGRAM(s) Oral two times a day  sacubitril 49 mG/valsartan 51 mG 1 Tablet(s) Oral two times a day  spironolactone 25 milliGRAM(s) Oral daily    MEDICATIONS  (PRN):  acetaminophen     Tablet .. 650 milliGRAM(s) Oral every 6 hours PRN Mild Pain (1 - 3), Moderate Pain (4 - 6)  dextrose Oral Gel 15 Gram(s) Oral once PRN Blood Glucose LESS THAN 70 milliGRAM(s)/deciliter  guaiFENesin Oral Liquid (Sugar-Free) 200 milliGRAM(s) Oral every 6 hours PRN Cough  melatonin 3 milliGRAM(s) Oral at bedtime PRN Insomnia      PHYSICAL EXAM:  Vital Signs Last 24 Hrs  T(C): 36.7 (07 Oct 2023 16:17), Max: 36.7 (07 Oct 2023 11:20)  T(F): 98.1 (07 Oct 2023 16:17), Max: 98.1 (07 Oct 2023 16:17)  HR: 104 (07 Oct 2023 16:17) (90 - 114)  BP: 136/76 (07 Oct 2023 16:17) (126/81 - 138/99)  BP(mean): --  RR: 18 (07 Oct 2023 16:17) (18 - 19)  SpO2: 99% (07 Oct 2023 16:17) (93% - 100%)    Parameters below as of 07 Oct 2023 16:17  Patient On (Oxygen Delivery Method): nasal cannula, 2L          GENERAL: NAD  no increased WOB  HEAD:  Atraumatic, Normocephalic  EYES: EOMI, PERRLA, conjunctiva and sclera clear  ENMT: No tonsillar erythema, exudates, or enlargement; Moist mucous membranes,    NECK: Supple, No JVD, Normal thyroid  NERVOUS SYSTEM:  Alert & Oriented X3, Good concentration; nonfocal   CHEST/LUNG: CTAB;  No rales, rhonchi, wheezing, or rubs  HEART: Regular rate and rhythm; No murmurs, rubs, or gallops  ABDOMEN: Soft, Nontender, Nondistended; Bowel sounds present  EXTREMITIES:  2+ Peripheral Pulses b/l, No clubbing, cyanosis, calf tenderness or edema b/l          LABS:                                   11.1   6.67  )-----------( 270      ( 07 Oct 2023 07:50 )             36.7   10-07    141  |  99  |  26<H>  ----------------------------<  121<H>  4.4   |  36<H>  |  0.94    Ca    8.3<L>      07 Oct 2023 07:50  Phos  3.5     10-07  Mg     2.2     10-07            Urinalysis Basic - ( 06 Oct 2023 08:18 )    Color: x / Appearance: x / SG: x / pH: x  Gluc: 126 mg/dL / Ketone: x  / Bili: x / Urobili: x   Blood: x / Protein: x / Nitrite: x   Leuk Esterase: x / RBC: x / WBC x   Sq Epi: x / Non Sq Epi: x / Bacteria: x            Consultant(s) Notes Reveiwed [ x] Yes     Care Discussed with [x ] Consultants  [x ] Patient  [ ] Family  [ x] /   [ x] Other; RN

## 2023-10-07 NOTE — PROGRESS NOTE ADULT - ASSESSMENT
78 years old female with history of HTN, HLD, CHF, A. fib, Morbid obesity, COPD, LEIDY presented w/ dyspnea due to acute respiratory failure with hypoxia and hypercapnia due to acute on chronic systolic congestive heart failure and CAP, initially requiring BIPAP but now on 0-2L NC. Pt was also found to be in Afib with RVR. .      Acute on chronic systolic congestive heart failure    - CXR with pulmonary congestion   - CT chest showed ground-glass opacities likely due to pulmonary edema in the setting of mild septal thickening, small pleural effusions and cardiomegaly. mosaic lung parenchyma with suspected air trapping.  - patient says she missed Lasix for a few days  - ECHO showed EF 45-50%, moderate tricuspid regurgitation with moderate pulmonary hypertension  - has been on IV Lasix 40mg bid will switch to po lasix today (40 BID)  - c/w telemetry    - Monitor I/O & daily weight  - c/w Entresto, metoprolol, aldactone & Lipitor  - c/w BiPAP QHS    CAP  - c/w Ceftriaxone, azithromycin for possible underling pneumonia  - respiratory PCR negative, pending: urine legionella Ag negative ( azithromycin dc'd), strep pneumo Ag  - Pulm note read and appreciated   - c/w Duoneb q6h standing for now, improving respiratory status  - desaturates to 85% while eating, but otherwise able to maintain saturations >90% on RA (as of 10/6)    Atrial fibrillation with rapid ventricular response   - rate controlled w/ metoprolol    - ECHO showed EF 45-50%, moderate tricuspid regurgitation with moderate pulmonary hypertension  - c/w telemetry monitoring  - c/w Apixaban       Hyperglycemia  - c/w ISS and hypoglycemia protocol  - A1C is 5.6    Euthyroid sick syndrome  - TSH is 0.133, free thyroxine is 1.2     Ill-defined 4 mm nodule at the left apex   - will need to get follow up scan once patient improves    HTN  - c/w metoprolol     Hyperlipidemia  - c/w Lipitor    Prophylaxis:  GI: PO diet  DVT: Apixaban   Dispo: PT consulted.  Daughter updated over the phone 10/5   78 years old female with history of HTN, HLD, CHF, A. fib, Morbid obesity, COPD, LEIDY presented w/ dyspnea due to acute respiratory failure with hypoxia and hypercapnia due to acute on chronic systolic congestive heart failure and CAP, initially requiring BIPAP but now on 0-2L NC. Pt was also found to be in Afib with RVR. .      Assessment and Plan:     Acute on chronic systolic congestive heart failure    - CXR with pulmonary congestion   - CT chest showed ground-glass opacities likely due to pulmonary edema in the setting of mild septal thickening, small pleural effusions and cardiomegaly. mosaic lung parenchyma with suspected air trapping.  - patient says she missed Lasix for a few days  - ECHO showed EF 45-50%, moderate tricuspid regurgitation with moderate pulmonary hypertension  - c/w telemetry    - Monitor I/O & daily weight  - c/w Entresto, metoprolol, aldactone & Lipitor  continue with lasix   - c/w BiPAP QHS    CAP  - respiratory PCR negative,   urine legionella Ag negative ( azithromycin dc'd), strep pneumo Ag neg   - Pulm note read and appreciated   - c/w Duoneb q6h standing for now, improving respiratory status  - desaturates to 85% while eating, but otherwise able to maintain saturations >90% on RA (as of 10/6)  completed ceftriaxone     Atrial fibrillation with rapid ventricular response   - rate controlled w/ metoprolol    - ECHO showed EF 45-50%, moderate tricuspid regurgitation with moderate pulmonary hypertension  Tele: afib HR    - c/w Apixaban         Hyperglycemia  - c/w ISS and hypoglycemia protocol  - A1C is 5.6    Euthyroid sick syndrome  - TSH is 0.133, free thyroxine is 1.2   repeat TFTs     Ill-defined 4 mm nodule at the left apex   - will need to get follow up scan outpatient     HTN  - c/w metoprolol     Hyperlipidemia  - c/w Lipitor    Preventative Measures   DVT: Apixaban   Dispo: [facility]ERTO      78 years old female with history of HTN, HLD, CHF, A. fib, Morbid obesity, COPD, LEIDY presented w/ dyspnea due to acute respiratory failure with hypoxia and hypercapnia due to acute on chronic systolic congestive heart failure and CAP, initially requiring BIPAP but now on 0-2L NC. Pt was also found to be in Afib with RVR. .      Assessment and Plan:     Acute on chronic systolic congestive heart failure    - CXR with pulmonary congestion   - CT chest showed ground-glass opacities likely due to pulmonary edema in the setting of mild septal thickening, small pleural effusions and cardiomegaly. mosaic lung parenchyma with suspected air trapping.  - patient says she missed Lasix for a few days  - ECHO showed EF 45-50%, moderate tricuspid regurgitation with moderate pulmonary hypertension  - c/w telemetry    - Monitor I/O & daily weight  - c/w Entresto, metoprolol, aldactone & Lipitor  continue with lasix   - c/w BiPAP QHS    CAP  - respiratory PCR negative,   urine legionella Ag negative ( azithromycin dc'd), strep pneumo Ag neg   - Pulm note read and appreciated   - c/w Duoneb q6h standing for now, improving respiratory status  - desaturates to 85% while eating, but otherwise able to maintain saturations >90% on RA (as of 10/6)  completed ceftriaxone     Atrial fibrillation with rapid ventricular response , better controlled HR on tele 90s-120s   - continue with    metoprolol, added cardizem     - ECHO showed EF 45-50%, moderate tricuspid regurgitation with moderate pulmonary hypertension  Tele: afib HR    - c/w Apixaban         Hyperglycemia  - c/w ISS and hypoglycemia protocol  - A1C is 5.6    Euthyroid sick syndrome  - TSH is 0.133, free thyroxine is 1.2   repeat TFTs     Ill-defined 4 mm nodule at the left apex   - will need to get follow up scan outpatient     HTN  - c/w metoprolol     Hyperlipidemia  - c/w Lipitor    Preventative Measures   DVT: Apixaban   Dispo: [facility]ERTO

## 2023-10-08 LAB
ALBUMIN SERPL ELPH-MCNC: 2.9 G/DL — LOW (ref 3.3–5)
ALP SERPL-CCNC: 80 U/L — SIGNIFICANT CHANGE UP (ref 40–120)
ALT FLD-CCNC: 46 U/L — SIGNIFICANT CHANGE UP (ref 12–78)
ANION GAP SERPL CALC-SCNC: 4 MMOL/L — LOW (ref 5–17)
AST SERPL-CCNC: 12 U/L — LOW (ref 15–37)
BILIRUB DIRECT SERPL-MCNC: 0.2 MG/DL — SIGNIFICANT CHANGE UP (ref 0–0.3)
BILIRUB INDIRECT FLD-MCNC: 0.2 MG/DL — SIGNIFICANT CHANGE UP (ref 0.2–1)
BILIRUB SERPL-MCNC: 0.4 MG/DL — SIGNIFICANT CHANGE UP (ref 0.2–1.2)
BUN SERPL-MCNC: 24 MG/DL — HIGH (ref 7–23)
CALCIUM SERPL-MCNC: 7.9 MG/DL — LOW (ref 8.5–10.1)
CHLORIDE SERPL-SCNC: 98 MMOL/L — SIGNIFICANT CHANGE UP (ref 96–108)
CO2 SERPL-SCNC: 39 MMOL/L — HIGH (ref 22–31)
CREAT SERPL-MCNC: 0.89 MG/DL — SIGNIFICANT CHANGE UP (ref 0.5–1.3)
EGFR: 67 ML/MIN/1.73M2 — SIGNIFICANT CHANGE UP
FOLATE SERPL-MCNC: 8.1 NG/ML — SIGNIFICANT CHANGE UP
GLUCOSE BLDC GLUCOMTR-MCNC: 118 MG/DL — HIGH (ref 70–99)
GLUCOSE SERPL-MCNC: 112 MG/DL — HIGH (ref 70–99)
MAGNESIUM SERPL-MCNC: 1.9 MG/DL — SIGNIFICANT CHANGE UP (ref 1.6–2.6)
PHOSPHATE SERPL-MCNC: 3.2 MG/DL — SIGNIFICANT CHANGE UP (ref 2.5–4.5)
POTASSIUM SERPL-MCNC: 4.2 MMOL/L — SIGNIFICANT CHANGE UP (ref 3.5–5.3)
POTASSIUM SERPL-SCNC: 4.2 MMOL/L — SIGNIFICANT CHANGE UP (ref 3.5–5.3)
PROT SERPL-MCNC: 6.8 GM/DL — SIGNIFICANT CHANGE UP (ref 6–8.3)
SODIUM SERPL-SCNC: 141 MMOL/L — SIGNIFICANT CHANGE UP (ref 135–145)
T3 SERPL-MCNC: 66 NG/DL — LOW (ref 80–200)
T4 FREE SERPL-MCNC: 1.2 NG/DL — SIGNIFICANT CHANGE UP (ref 0.9–1.8)
TSH SERPL-MCNC: 0.79 UU/ML — SIGNIFICANT CHANGE UP (ref 0.36–3.74)
VIT B12 SERPL-MCNC: 591 PG/ML — SIGNIFICANT CHANGE UP (ref 232–1245)

## 2023-10-08 PROCEDURE — 99232 SBSQ HOSP IP/OBS MODERATE 35: CPT

## 2023-10-08 RX ADMIN — Medication 30 MILLIGRAM(S): at 00:27

## 2023-10-08 RX ADMIN — Medication 3 MILLILITER(S): at 05:05

## 2023-10-08 RX ADMIN — Medication 100 MILLIGRAM(S): at 17:35

## 2023-10-08 RX ADMIN — Medication 30 MILLIGRAM(S): at 05:20

## 2023-10-08 RX ADMIN — Medication 3 MILLILITER(S): at 11:09

## 2023-10-08 RX ADMIN — Medication 100 MILLIGRAM(S): at 05:20

## 2023-10-08 RX ADMIN — Medication 3 MILLIGRAM(S): at 21:38

## 2023-10-08 RX ADMIN — SACUBITRIL AND VALSARTAN 1 TABLET(S): 24; 26 TABLET, FILM COATED ORAL at 05:20

## 2023-10-08 RX ADMIN — Medication 30 MILLIGRAM(S): at 17:34

## 2023-10-08 RX ADMIN — Medication 40 MILLIGRAM(S): at 05:20

## 2023-10-08 RX ADMIN — SENNA PLUS 2 TABLET(S): 8.6 TABLET ORAL at 21:32

## 2023-10-08 RX ADMIN — Medication 100 MILLIGRAM(S): at 13:18

## 2023-10-08 RX ADMIN — APIXABAN 5 MILLIGRAM(S): 2.5 TABLET, FILM COATED ORAL at 05:20

## 2023-10-08 RX ADMIN — ATORVASTATIN CALCIUM 20 MILLIGRAM(S): 80 TABLET, FILM COATED ORAL at 21:32

## 2023-10-08 RX ADMIN — Medication 40 MILLIGRAM(S): at 13:29

## 2023-10-08 RX ADMIN — SACUBITRIL AND VALSARTAN 1 TABLET(S): 24; 26 TABLET, FILM COATED ORAL at 17:35

## 2023-10-08 RX ADMIN — Medication 3 MILLILITER(S): at 17:13

## 2023-10-08 RX ADMIN — APIXABAN 5 MILLIGRAM(S): 2.5 TABLET, FILM COATED ORAL at 17:35

## 2023-10-08 RX ADMIN — Medication 100 MILLIGRAM(S): at 21:32

## 2023-10-08 RX ADMIN — Medication 3 MILLILITER(S): at 23:17

## 2023-10-08 RX ADMIN — SPIRONOLACTONE 25 MILLIGRAM(S): 25 TABLET, FILM COATED ORAL at 05:20

## 2023-10-08 NOTE — PROGRESS NOTE ADULT - SUBJECTIVE AND OBJECTIVE BOX
SUBJECTIVE / OVERNIGHT EVENTS:  Patient seen and examined bedside.   no overnight events     REVIEW OF SYSTEMS: remaining ROS negative     MEDICATIONS  (STANDING):  albuterol/ipratropium for Nebulization 3 milliLiter(s) Nebulizer every 6 hours  apixaban 5 milliGRAM(s) Oral every 12 hours  atorvastatin 20 milliGRAM(s) Oral at bedtime  benzonatate 100 milliGRAM(s) Oral every 8 hours  cefTRIAXone   IVPB 1000 milliGRAM(s) IV Intermittent every 24 hours  dextrose 5%. 1000 milliLiter(s) (50 mL/Hr) IV Continuous <Continuous>  dextrose 5%. 1000 milliLiter(s) (100 mL/Hr) IV Continuous <Continuous>  dextrose 50% Injectable 12.5 Gram(s) IV Push once  dextrose 50% Injectable 25 Gram(s) IV Push once  dextrose 50% Injectable 25 Gram(s) IV Push once  furosemide    Tablet 40 milliGRAM(s) Oral two times a day  glucagon  Injectable 1 milliGRAM(s) IntraMuscular once  influenza  Vaccine (HIGH DOSE) 0.7 milliLiter(s) IntraMuscular once  insulin lispro (ADMELOG) corrective regimen sliding scale   SubCutaneous three times a day before meals  insulin lispro (ADMELOG) corrective regimen sliding scale   SubCutaneous at bedtime  metoprolol tartrate 100 milliGRAM(s) Oral two times a day  sacubitril 49 mG/valsartan 51 mG 1 Tablet(s) Oral two times a day  spironolactone 25 milliGRAM(s) Oral daily    MEDICATIONS  (PRN):  acetaminophen     Tablet .. 650 milliGRAM(s) Oral every 6 hours PRN Mild Pain (1 - 3), Moderate Pain (4 - 6)  dextrose Oral Gel 15 Gram(s) Oral once PRN Blood Glucose LESS THAN 70 milliGRAM(s)/deciliter  guaiFENesin Oral Liquid (Sugar-Free) 200 milliGRAM(s) Oral every 6 hours PRN Cough  melatonin 3 milliGRAM(s) Oral at bedtime PRN Insomnia      PHYSICAL EXAM:  Vital Signs Last 24 Hrs  T(C): 36.7 (07 Oct 2023 16:17), Max: 36.7 (07 Oct 2023 11:20)  T(F): 98.1 (07 Oct 2023 16:17), Max: 98.1 (07 Oct 2023 16:17)  HR: 104 (07 Oct 2023 16:17) (90 - 114)  BP: 136/76 (07 Oct 2023 16:17) (126/81 - 138/99)  BP(mean): --  RR: 18 (07 Oct 2023 16:17) (18 - 19)  SpO2: 99% (07 Oct 2023 16:17) (93% - 100%)    Parameters below as of 07 Oct 2023 16:17  Patient On (Oxygen Delivery Method): nasal cannula, 2L          GENERAL: NAD  no increased WOB  HEAD:  Atraumatic, Normocephalic  EYES: EOMI, PERRLA, conjunctiva and sclera clear  ENMT: No tonsillar erythema, exudates, or enlargement; Moist mucous membranes,    NECK: Supple, No JVD, Normal thyroid  NERVOUS SYSTEM:  Alert & Oriented X3, Good concentration; nonfocal   CHEST/LUNG: CTAB;  No rales, rhonchi, wheezing, or rubs  HEART: Regular rate and rhythm; No murmurs, rubs, or gallops  ABDOMEN: Soft, Nontender, Nondistended; Bowel sounds present  EXTREMITIES:  2+ Peripheral Pulses b/l, No clubbing, cyanosis, calf tenderness or edema b/l          LABS:                                   11.1   6.67  )-----------( 270      ( 07 Oct 2023 07:50 )             36.7   10-07    141  |  99  |  26<H>  ----------------------------<  121<H>  4.4   |  36<H>  |  0.94    Ca    8.3<L>      07 Oct 2023 07:50  Phos  3.5     10-07  Mg     2.2     10-07            Urinalysis Basic - ( 06 Oct 2023 08:18 )    Color: x / Appearance: x / SG: x / pH: x  Gluc: 126 mg/dL / Ketone: x  / Bili: x / Urobili: x   Blood: x / Protein: x / Nitrite: x   Leuk Esterase: x / RBC: x / WBC x   Sq Epi: x / Non Sq Epi: x / Bacteria: x            Consultant(s) Notes Reveiwed [ x] Yes     Care Discussed with [x ] Consultants  [x ] Patient  [ ] Family  [ x] /   [ x] Other; RN

## 2023-10-08 NOTE — PROGRESS NOTE ADULT - ASSESSMENT
78 years old female with history of HTN, HLD, CHF, A. fib, Morbid obesity, COPD, LEIDY presented w/ dyspnea due to acute respiratory failure with hypoxia and hypercapnia due to acute on chronic systolic congestive heart failure and CAP, initially requiring BIPAP but now on 0-2L NC. Pt was also found to be in Afib with RVR. .      Assessment and Plan:     Acute on chronic systolic congestive heart failure    - CXR with pulmonary congestion   - CT chest showed ground-glass opacities likely due to pulmonary edema in the setting of mild septal thickening, small pleural effusions and cardiomegaly. mosaic lung parenchyma with suspected air trapping.  - patient says she missed Lasix for a few days  - ECHO showed EF 45-50%, moderate tricuspid regurgitation with moderate pulmonary hypertension  - c/w telemetry    - Monitor I/O & daily weight  - c/w Entresto, metoprolol, aldactone & Lipitor  continue with lasix   - c/w BiPAP QHS    CAP  - respiratory PCR negative,   urine legionella Ag negative ( azithromycin dc'd), strep pneumo Ag neg   - Pulm note read and appreciated   - c/w Duoneb q6h standing for now, improving respiratory status  - desaturates to 85% while eating, but otherwise able to maintain saturations >90% on RA (as of 10/6)  completed ceftriaxone     Atrial fibrillation with rapid ventricular response , better controlled HR on tele 90s-120s   - continue with    metoprolol, added cardizem     - ECHO showed EF 45-50%, moderate tricuspid regurgitation with moderate pulmonary hypertension  Tele: afib HR    - c/w Apixaban         Hyperglycemia  - c/w ISS and hypoglycemia protocol  - A1C is 5.6    Euthyroid sick syndrome  - TSH is 0.133, free thyroxine is 1.2   repeat TFTs     Ill-defined 4 mm nodule at the left apex   - will need to get follow up scan outpatient     HTN  - c/w metoprolol     Hyperlipidemia  - c/w Lipitor    Preventative Measures   DVT: Apixaban   Dispo: [facility]

## 2023-10-09 LAB
GLUCOSE BLDC GLUCOMTR-MCNC: 106 MG/DL — HIGH (ref 70–99)
GLUCOSE BLDC GLUCOMTR-MCNC: 130 MG/DL — HIGH (ref 70–99)
GLUCOSE BLDC GLUCOMTR-MCNC: 152 MG/DL — HIGH (ref 70–99)

## 2023-10-09 PROCEDURE — 99232 SBSQ HOSP IP/OBS MODERATE 35: CPT

## 2023-10-09 RX ADMIN — Medication 30 MILLIGRAM(S): at 00:48

## 2023-10-09 RX ADMIN — Medication 3 MILLILITER(S): at 05:19

## 2023-10-09 RX ADMIN — Medication 40 MILLIGRAM(S): at 05:39

## 2023-10-09 RX ADMIN — Medication 30 MILLIGRAM(S): at 23:53

## 2023-10-09 RX ADMIN — Medication 100 MILLIGRAM(S): at 05:38

## 2023-10-09 RX ADMIN — Medication 3 MILLILITER(S): at 23:55

## 2023-10-09 RX ADMIN — Medication 40 MILLIGRAM(S): at 13:46

## 2023-10-09 RX ADMIN — Medication 100 MILLIGRAM(S): at 17:07

## 2023-10-09 RX ADMIN — Medication 100 MILLIGRAM(S): at 05:37

## 2023-10-09 RX ADMIN — SPIRONOLACTONE 25 MILLIGRAM(S): 25 TABLET, FILM COATED ORAL at 05:38

## 2023-10-09 RX ADMIN — ATORVASTATIN CALCIUM 20 MILLIGRAM(S): 80 TABLET, FILM COATED ORAL at 21:39

## 2023-10-09 RX ADMIN — Medication 3 MILLILITER(S): at 17:06

## 2023-10-09 RX ADMIN — Medication 30 MILLIGRAM(S): at 17:07

## 2023-10-09 RX ADMIN — SENNA PLUS 2 TABLET(S): 8.6 TABLET ORAL at 21:38

## 2023-10-09 RX ADMIN — Medication 3 MILLILITER(S): at 11:02

## 2023-10-09 RX ADMIN — APIXABAN 5 MILLIGRAM(S): 2.5 TABLET, FILM COATED ORAL at 05:38

## 2023-10-09 RX ADMIN — Medication 100 MILLIGRAM(S): at 13:45

## 2023-10-09 RX ADMIN — Medication 100 MILLIGRAM(S): at 21:38

## 2023-10-09 RX ADMIN — SACUBITRIL AND VALSARTAN 1 TABLET(S): 24; 26 TABLET, FILM COATED ORAL at 05:37

## 2023-10-09 RX ADMIN — Medication 30 MILLIGRAM(S): at 05:38

## 2023-10-09 RX ADMIN — SACUBITRIL AND VALSARTAN 1 TABLET(S): 24; 26 TABLET, FILM COATED ORAL at 17:07

## 2023-10-09 RX ADMIN — APIXABAN 5 MILLIGRAM(S): 2.5 TABLET, FILM COATED ORAL at 17:07

## 2023-10-09 NOTE — PROGRESS NOTE ADULT - ASSESSMENT
78 years old female with pmhhx of morbid obesity, HTN, HLD, CHF, A. fib, COPD, LEIDY presented w/ dyspnea due to acute respiratory failure with hypoxia and hypercapnia due to acute on chronic systolic congestive heart failure and CAP, initially requiring BIPAP but now on 0-2L NC. Hospital course complicated by afib w/ RVR     CARDIOLOGY  # HF w/ ReF  # Acute on chronic systolic congestive heart failure    # afib  # afib w/ RVR- resolved   # HTN  # HLD  - CXR with pulmonary congestion   - CT chest showed ground-glass opacities likely due to pulmonary edema in the setting of mild septal thickening, small pleural effusions and cardiomegaly. mosaic lung parenchyma with suspected air trapping.  - decompensated CHF possibly secondary to medication non-complaince, patient says she missed Lasix for a few days  - ECHO showed EF 45-50%, moderate tricuspid regurgitation with moderate pulmonary hypertension  - c/w Entresto, metoprolol, aldactone & Lipitor  continue with lasix   - c/w BiPAP QHS  - eliquis for CVA prophalaxis    PULMNOLOGY  # acute hypoxemic respiratory failure  # CAP  # pulmonary nodule  - respiratory PCR negative,   urine legionella Ag negative ( azithromycin dc'd), strep pneumo Ag neg   - Pulm note read and appreciated   - c/w Duoneb q6h standing for now, improving respiratory status  - desaturates to 85% while eating, but otherwise able to maintain saturations >90% on RA (as of 10/6)  completed ceftriaxone   - will need to get follow up scan outpatient     ENDOCRINOLOGY  # Euthyroid sick syndrome  - TSH is 0.133, free thyroxine is 1.2     PLAN  - c/w TELE  - monitor  - discharge planning    Preventative Measures   DVT: Apixaban   Dispo: [facility]     - spoke to pt at bedside on 10/9- pt reports that she now wants to go to rehab

## 2023-10-09 NOTE — PROGRESS NOTE ADULT - SUBJECTIVE AND OBJECTIVE BOX
Patient is a 78y old  Female who presents with a chief complaint of acute respiratory failure with hypoxia/hypercapnia, CHF exacerbation, Afib with RVR (08 Oct 2023 14:36)    INTERVAL HPI/OVERNIGHT EVENTS: no acute events  SUBJECTIVE: reports feeling weak and feeling no better than yesterday     MEDICATIONS  (STANDING):  albuterol/ipratropium for Nebulization 3 milliLiter(s) Nebulizer every 6 hours  apixaban 5 milliGRAM(s) Oral every 12 hours  atorvastatin 20 milliGRAM(s) Oral at bedtime  benzonatate 100 milliGRAM(s) Oral every 8 hours  dextrose 5%. 1000 milliLiter(s) (50 mL/Hr) IV Continuous <Continuous>  dextrose 5%. 1000 milliLiter(s) (100 mL/Hr) IV Continuous <Continuous>  dextrose 50% Injectable 12.5 Gram(s) IV Push once  dextrose 50% Injectable 25 Gram(s) IV Push once  dextrose 50% Injectable 25 Gram(s) IV Push once  diltiazem    Tablet 30 milliGRAM(s) Oral four times a day  furosemide    Tablet 40 milliGRAM(s) Oral two times a day  glucagon  Injectable 1 milliGRAM(s) IntraMuscular once  influenza  Vaccine (HIGH DOSE) 0.7 milliLiter(s) IntraMuscular once  insulin lispro (ADMELOG) corrective regimen sliding scale   SubCutaneous three times a day before meals  insulin lispro (ADMELOG) corrective regimen sliding scale   SubCutaneous at bedtime  metoprolol tartrate 100 milliGRAM(s) Oral two times a day  sacubitril 49 mG/valsartan 51 mG 1 Tablet(s) Oral two times a day  senna 2 Tablet(s) Oral at bedtime  spironolactone 25 milliGRAM(s) Oral daily    MEDICATIONS  (PRN):  acetaminophen     Tablet .. 650 milliGRAM(s) Oral every 6 hours PRN Mild Pain (1 - 3), Moderate Pain (4 - 6)  dextrose Oral Gel 15 Gram(s) Oral once PRN Blood Glucose LESS THAN 70 milliGRAM(s)/deciliter  melatonin 3 milliGRAM(s) Oral at bedtime PRN Insomnia    Allergies    No Known Allergies    Intolerances      REVIEW OF SYSTEMS:  All other systems reviewed and are negative    Vital Signs Last 24 Hrs  T(C): 36.7 (09 Oct 2023 16:24), Max: 36.7 (09 Oct 2023 11:24)  T(F): 98.1 (09 Oct 2023 16:24), Max: 98.1 (09 Oct 2023 11:24)  HR: 99 (09 Oct 2023 17:06) (12 - 103)  BP: 140/71 (09 Oct 2023 16:24) (103/72 - 143/76)  BP(mean): --  RR: 19 (09 Oct 2023 16:24) (18 - 20)  SpO2: 97% (09 Oct 2023 17:06) (93% - 100%)    Parameters below as of 09 Oct 2023 17:06  Patient On (Oxygen Delivery Method): nasal cannula      Daily     Daily Weight in k.5 (09 Oct 2023 04:59)  I&O's Summary    08 Oct 2023 07:01  -  09 Oct 2023 07:00  --------------------------------------------------------  IN: 1080 mL / OUT: 200 mL / NET: 880 mL      CAPILLARY BLOOD GLUCOSE      POCT Blood Glucose.: 130 mg/dL (09 Oct 2023 11:46)  POCT Blood Glucose.: 127 mg/dL (09 Oct 2023 08:08)  POCT Blood Glucose.: 129 mg/dL (08 Oct 2023 21:35)    PHYSICAL EXAM:  GENERAL: NAD, well-groomed, well-developed. obese  HEAD:  Atraumatic, Normocephalic  NECK: Supple, No JVD, Normal thyroid  NERVOUS SYSTEM:  Alert & Oriented X3, Good concentration  CHEST/LUNG: Clear to percussion bilaterally; No rales, rhonchi, wheezing, or rubs  HEART: irregular irregular. no murmur  ABDOMEN: Soft, Nontender, Nondistended; Bowel sounds present  EXTREMITIES:  1+ edema     Labs      10-08    141  |  98  |  24<H>  ----------------------------<  112<H>  4.2   |  39<H>  |  0.89    Ca    7.9<L>      08 Oct 2023 05:15  Phos  3.2     10-08  Mg     1.9     10-08    TPro  6.8  /  Alb  2.9<L>  /  TBili  0.4  /  DBili  0.2  /  AST  12<L>  /  ALT  46  /  AlkPhos  80  10-          Urinalysis Basic - ( 08 Oct 2023 05:15 )    Color: x / Appearance: x / SG: x / pH: x  Gluc: 112 mg/dL / Ketone: x  / Bili: x / Urobili: x   Blood: x / Protein: x / Nitrite: x   Leuk Esterase: x / RBC: x / WBC x   Sq Epi: x / Non Sq Epi: x / Bacteria: x

## 2023-10-10 LAB
ALBUMIN SERPL ELPH-MCNC: 3.2 G/DL — LOW (ref 3.3–5)
ALP SERPL-CCNC: 90 U/L — SIGNIFICANT CHANGE UP (ref 40–120)
ALT FLD-CCNC: 36 U/L — SIGNIFICANT CHANGE UP (ref 12–78)
ANION GAP SERPL CALC-SCNC: 3 MMOL/L — LOW (ref 5–17)
AST SERPL-CCNC: 13 U/L — LOW (ref 15–37)
BILIRUB SERPL-MCNC: 0.6 MG/DL — SIGNIFICANT CHANGE UP (ref 0.2–1.2)
BUN SERPL-MCNC: 29 MG/DL — HIGH (ref 7–23)
CALCIUM SERPL-MCNC: 8.7 MG/DL — SIGNIFICANT CHANGE UP (ref 8.5–10.1)
CHLORIDE SERPL-SCNC: 96 MMOL/L — SIGNIFICANT CHANGE UP (ref 96–108)
CO2 SERPL-SCNC: 39 MMOL/L — HIGH (ref 22–31)
CREAT SERPL-MCNC: 1.02 MG/DL — SIGNIFICANT CHANGE UP (ref 0.5–1.3)
EGFR: 57 ML/MIN/1.73M2 — LOW
GLUCOSE BLDC GLUCOMTR-MCNC: 117 MG/DL — HIGH (ref 70–99)
GLUCOSE BLDC GLUCOMTR-MCNC: 135 MG/DL — HIGH (ref 70–99)
GLUCOSE BLDC GLUCOMTR-MCNC: 135 MG/DL — HIGH (ref 70–99)
GLUCOSE BLDC GLUCOMTR-MCNC: 139 MG/DL — HIGH (ref 70–99)
GLUCOSE BLDC GLUCOMTR-MCNC: 196 MG/DL — HIGH (ref 70–99)
GLUCOSE SERPL-MCNC: 113 MG/DL — HIGH (ref 70–99)
MAGNESIUM SERPL-MCNC: 2.2 MG/DL — SIGNIFICANT CHANGE UP (ref 1.6–2.6)
PHOSPHATE SERPL-MCNC: 3.7 MG/DL — SIGNIFICANT CHANGE UP (ref 2.5–4.5)
POTASSIUM SERPL-MCNC: 3.9 MMOL/L — SIGNIFICANT CHANGE UP (ref 3.5–5.3)
POTASSIUM SERPL-SCNC: 3.9 MMOL/L — SIGNIFICANT CHANGE UP (ref 3.5–5.3)
PROT SERPL-MCNC: 7.4 GM/DL — SIGNIFICANT CHANGE UP (ref 6–8.3)
SODIUM SERPL-SCNC: 138 MMOL/L — SIGNIFICANT CHANGE UP (ref 135–145)

## 2023-10-10 PROCEDURE — 99232 SBSQ HOSP IP/OBS MODERATE 35: CPT

## 2023-10-10 RX ORDER — IPRATROPIUM/ALBUTEROL SULFATE 18-103MCG
3 AEROSOL WITH ADAPTER (GRAM) INHALATION EVERY 6 HOURS
Refills: 0 | Status: DISCONTINUED | OUTPATIENT
Start: 2023-10-10 | End: 2023-10-16

## 2023-10-10 RX ADMIN — Medication 40 MILLIGRAM(S): at 05:36

## 2023-10-10 RX ADMIN — APIXABAN 5 MILLIGRAM(S): 2.5 TABLET, FILM COATED ORAL at 05:36

## 2023-10-10 RX ADMIN — SACUBITRIL AND VALSARTAN 1 TABLET(S): 24; 26 TABLET, FILM COATED ORAL at 17:30

## 2023-10-10 RX ADMIN — SACUBITRIL AND VALSARTAN 1 TABLET(S): 24; 26 TABLET, FILM COATED ORAL at 05:36

## 2023-10-10 RX ADMIN — SPIRONOLACTONE 25 MILLIGRAM(S): 25 TABLET, FILM COATED ORAL at 05:36

## 2023-10-10 RX ADMIN — Medication 100 MILLIGRAM(S): at 15:00

## 2023-10-10 RX ADMIN — Medication 30 MILLIGRAM(S): at 17:27

## 2023-10-10 RX ADMIN — Medication 100 MILLIGRAM(S): at 17:30

## 2023-10-10 RX ADMIN — Medication 40 MILLIGRAM(S): at 14:59

## 2023-10-10 RX ADMIN — Medication 100 MILLIGRAM(S): at 05:37

## 2023-10-10 RX ADMIN — Medication 3 MILLILITER(S): at 05:37

## 2023-10-10 RX ADMIN — APIXABAN 5 MILLIGRAM(S): 2.5 TABLET, FILM COATED ORAL at 17:27

## 2023-10-10 RX ADMIN — Medication 30 MILLIGRAM(S): at 05:37

## 2023-10-10 RX ADMIN — Medication 100 MILLIGRAM(S): at 05:36

## 2023-10-10 RX ADMIN — Medication 1: at 11:30

## 2023-10-10 RX ADMIN — Medication 3 MILLILITER(S): at 11:38

## 2023-10-10 RX ADMIN — Medication 3 MILLILITER(S): at 17:11

## 2023-10-10 RX ADMIN — Medication 30 MILLIGRAM(S): at 11:33

## 2023-10-10 RX ADMIN — Medication 3 MILLILITER(S): at 23:45

## 2023-10-10 NOTE — PROGRESS NOTE ADULT - ASSESSMENT
77F w/ HTN, ?COPD, CHF, Afib, LEIDY on CPAP. Presents w/ SOB and cough. Pt placed on BiPAP in ED, given steroids and diuretics. ABG showed mild acute on chronic hypercapnia and hypoxemia. POCUS performed at bedside revealed A line predominance w/ bibasilar consolidation patterns concerned for possible pneumonia vs pulmonary edema    - clinically improved, satting 91% on RA and asx that improves with deep breathing. suspect large component of atelectasis. maintain sats>92%  -encourage oob to chair and incetinve spirometer use   - wearing BiPAP overnight; she usually uses CPAP but BiPAP will help given some degree of chronic hypercapnia  - cont bipap qhs for chronic hypercapnia  - continue diuretics   - abx as per primary team   - unclear hx of COPD vs asthma - pt never smoked, may have asthma not COPD, but no wheezing so will hold off on steroids; pt does not use any inhalers, except for albuterol as needed; continue duonebs prn   - will need pulmonary followup upon d/c - she has a pulmonologist that she sees on outside, did not recall name.     d/w dr estevez

## 2023-10-10 NOTE — PROGRESS NOTE ADULT - SUBJECTIVE AND OBJECTIVE BOX
Patient is a 78y old  Female who presents with a chief complaint of acute respiratory failure with hypoxia/hypercapnia, CHF exacerbation, Afib with RVR (09 Oct 2023 18:51)      INTERVAL HPI/OVERNIGHT EVENTS: pt doing well, sating on 2L, states that wants to go to Rehab now.     MEDICATIONS  (STANDING):  albuterol/ipratropium for Nebulization 3 milliLiter(s) Nebulizer every 6 hours  apixaban 5 milliGRAM(s) Oral every 12 hours  atorvastatin 20 milliGRAM(s) Oral at bedtime  benzonatate 100 milliGRAM(s) Oral every 8 hours  dextrose 5%. 1000 milliLiter(s) (50 mL/Hr) IV Continuous <Continuous>  dextrose 5%. 1000 milliLiter(s) (100 mL/Hr) IV Continuous <Continuous>  dextrose 50% Injectable 12.5 Gram(s) IV Push once  dextrose 50% Injectable 25 Gram(s) IV Push once  dextrose 50% Injectable 25 Gram(s) IV Push once  diltiazem    Tablet 30 milliGRAM(s) Oral four times a day  furosemide    Tablet 40 milliGRAM(s) Oral two times a day  glucagon  Injectable 1 milliGRAM(s) IntraMuscular once  influenza  Vaccine (HIGH DOSE) 0.7 milliLiter(s) IntraMuscular once  insulin lispro (ADMELOG) corrective regimen sliding scale   SubCutaneous three times a day before meals  insulin lispro (ADMELOG) corrective regimen sliding scale   SubCutaneous at bedtime  metoprolol tartrate 100 milliGRAM(s) Oral two times a day  sacubitril 49 mG/valsartan 51 mG 1 Tablet(s) Oral two times a day  senna 2 Tablet(s) Oral at bedtime  spironolactone 25 milliGRAM(s) Oral daily    MEDICATIONS  (PRN):  acetaminophen     Tablet .. 650 milliGRAM(s) Oral every 6 hours PRN Mild Pain (1 - 3), Moderate Pain (4 - 6)  dextrose Oral Gel 15 Gram(s) Oral once PRN Blood Glucose LESS THAN 70 milliGRAM(s)/deciliter  melatonin 3 milliGRAM(s) Oral at bedtime PRN Insomnia      Allergies    No Known Allergies    Intolerances        REVIEW OF SYSTEMS:  CONSTITUTIONAL: No fever, weight loss, or fatigue  EYES: No eye pain, visual disturbances, or discharge  ENMT:  No difficulty hearing, tinnitus, vertigo; No sinus or throat pain  NECK: No pain or stiffness  BREASTS: No pain, masses, or nipple discharge  RESPIRATORY: No cough, wheezing, chills or hemoptysis; No shortness of breath  CARDIOVASCULAR: No chest pain, palpitations, dizziness, or leg swelling  GASTROINTESTINAL: No abdominal or epigastric pain. No nausea, vomiting, or hematemesis; No diarrhea or constipation. No melena or hematochezia.  GENITOURINARY: No dysuria, frequency, hematuria, or incontinence  NEUROLOGICAL: No headaches, memory loss, loss of strength, numbness, or tremors  SKIN: No itching, burning, rashes, or lesions   LYMPH NODES: No enlarged glands  ENDOCRINE: No heat or cold intolerance; No hair loss  MUSCULOSKELETAL: No joint pain or swelling; No muscle, back, or extremity pain  PSYCHIATRIC: No depression, anxiety, mood swings, or difficulty sleeping  HEME/LYMPH: No easy bruising, or bleeding gums  ALLERGY AND IMMUNOLOGIC: No hives or eczema    Vital Signs Last 24 Hrs  T(C): 36.4 (10 Oct 2023 10:42), Max: 36.7 (09 Oct 2023 16:24)  T(F): 97.6 (10 Oct 2023 10:42), Max: 98.1 (09 Oct 2023 16:24)  HR: 84 (10 Oct 2023 10:42) (80 - 100)  BP: 109/70 (10 Oct 2023 10:42) (109/70 - 141/96)  BP(mean): --  RR: 18 (10 Oct 2023 10:42) (17 - 19)  SpO2: 93% (10 Oct 2023 10:42) (93% - 99%)    Parameters below as of 10 Oct 2023 06:26  Patient On (Oxygen Delivery Method): nasal cannula, 2 LPM        PHYSICAL EXAM:  GENERAL: NAD, well-groomed, well-developed  HEAD:  Atraumatic, Normocephalic  EYES: EOMI, PERRLA, conjunctiva and sclera clear  ENMT: No tonsillar erythema, exudates, or enlargement; Moist mucous membranes, Good dentition, No lesions  NECK: Supple, No JVD,  NERVOUS SYSTEM:  Alert & Oriented X3, Good concentration; Motor Strength 5/5 B/L upper and lower extremities; DTRs 2+ intact and symmetric  CHEST/LUNG: Clear to percussion bilaterally; No rales, rhonchi, wheezing, or rubs  HEART: Regular rate and rhythm; No murmurs, rubs, or gallops  ABDOMEN: Soft, Nontender, Nondistended; Bowel sounds present  EXTREMITIES:  2+ Peripheral Pulses, No clubbing, cyanosis, or edema  SKIN: No rashes or lesions    LABS:    10-10    138  |  96  |  29<H>  ----------------------------<  113<H>  3.9   |  39<H>  |  1.02    Ca    8.7      10 Oct 2023 07:22  Phos  3.7     10-10  Mg     2.2     10-10    TPro  7.4  /  Alb  3.2<L>  /  TBili  0.6  /  DBili  x   /  AST  13<L>  /  ALT  36  /  AlkPhos  90  10-10      Urinalysis Basic - ( 10 Oct 2023 07:22 )    Color: x / Appearance: x / SG: x / pH: x  Gluc: 113 mg/dL / Ketone: x  / Bili: x / Urobili: x   Blood: x / Protein: x / Nitrite: x   Leuk Esterase: x / RBC: x / WBC x   Sq Epi: x / Non Sq Epi: x / Bacteria: x      CAPILLARY BLOOD GLUCOSE      POCT Blood Glucose.: 196 mg/dL (10 Oct 2023 11:01)  POCT Blood Glucose.: 139 mg/dL (10 Oct 2023 08:34)  POCT Blood Glucose.: 152 mg/dL (09 Oct 2023 22:33)  POCT Blood Glucose.: 106 mg/dL (09 Oct 2023 17:10)      RADIOLOGY & ADDITIONAL TESTS:    Imaging Personally Reviewed:  [ ] YES  [ ] NO    Consultant(s) Notes Reviewed:  [ ] YES  [ ] NO    Care Discussed with Consultants/Other Providers [ ] YES  [ ] NO

## 2023-10-10 NOTE — PROGRESS NOTE ADULT - ASSESSMENT
78 years old female with pmhhx of morbid obesity, HTN, HLD, CHF, A. fib, COPD, LEIDY presented w/ dyspnea due to acute respiratory failure with hypoxia and hypercapnia due to acute on chronic systolic congestive heart failure and CAP, initially requiring BIPAP but now on 0-2L NC. Hospital course complicated by afib w/ RVR     # HF w/ ReF  # Acute on chronic systolic congestive heart failure    # afib  # afib w/ RVR- resolved   # HTN  # HLD  - CXR with pulmonary congestion   - CT chest showed ground-glass opacities likely due to pulmonary edema in the setting of mild septal thickening, small pleural effusions and cardiomegaly. mosaic lung parenchyma with suspected air trapping.  - decompensated CHF possibly secondary to medication non-complaince, patient says she missed Lasix for a few days  - ECHO showed EF 45-50%, moderate tricuspid regurgitation with moderate pulmonary hypertension  - c/w Entresto, metoprolol, aldactone & Lipitor  continue with lasix   - c/w BiPAP QHS  - eliquis for CVA prophalaxis    # acute hypoxemic respiratory failure  # CAP  # pulmonary nodule  - respiratory PCR negative,   urine legionella Ag negative ( azithromycin dc'd), strep pneumo Ag neg   - Pulm note read and appreciated   - c/w Duoneb q6h standing for now, improving respiratory status  - desaturates to 85% while eating, but otherwise able to maintain saturations >90% on RA (as of 10/6)  completed ceftriaxone   - will need to get follow up scan outpatient     # Euthyroid sick syndrome  - TSH is 0.133, free thyroxine is 1.2     PLAN  - c/w TELE  - monitor  - discharge planning    Preventative Measures   DVT: Apixaban   Dispo: [facility] - in process of obtaining authorization

## 2023-10-10 NOTE — PROGRESS NOTE ADULT - SUBJECTIVE AND OBJECTIVE BOX
INTERVAL HPI/OVERNIGHT EVENTS: no acute events overnight     SUBJECTIVE: Patient seen and examined at bedside. Pt satting 91% on RA that  improves to 96% with deep breathing. Currently denies SOB, CP, fevers/chills, cough     ROS: All negative except as listed above.    VITAL SIGNS:  ICU Vital Signs Last 24 Hrs  T(C): 36.7 (10 Oct 2023 16:32), Max: 36.7 (10 Oct 2023 16:32)  T(F): 98.1 (10 Oct 2023 16:32), Max: 98.1 (10 Oct 2023 16:32)  HR: 87 (10 Oct 2023 16:32) (76 - 94)  BP: 106/65 (10 Oct 2023 16:32) (106/65 - 141/96)  BP(mean): --  ABP: --  ABP(mean): --  RR: 19 (10 Oct 2023 16:32) (17 - 19)  SpO2: 90% (10 Oct 2023 16:32) (90% - 99%)    O2 Parameters below as of 10 Oct 2023 12:00  Patient On (Oxygen Delivery Method): nasal cannula      10-09 @ 07:01  -  10-10 @ 07:00  --------------------------------------------------------  IN: 350 mL / OUT: 0 mL / NET: 350 mL    10-10 @ 07:01  -  10-10 @ 17:26  --------------------------------------------------------  IN: 400 mL / OUT: 0 mL / NET: 400 mL      CAPILLARY BLOOD GLUCOSE      POCT Blood Glucose.: 117 mg/dL (10 Oct 2023 16:41)      ECG: reviewed.    PHYSICAL EXAM:    GENERAL: NAD,  sitting on edge of bed comfortably   HEAD:  Atraumatic, normocephalic  EYES: EOMI, PERRL  NECK: Supple, trachea midline, no JVD  HEART: Regular rate and rhythm  LUNGS: Unlabored respirations. mild end expiratory wheezing  ABDOMEN: Soft, nontender, nondistended, +BS  EXTREMITIES: 2+ peripheral pulses bilaterally, cap refill<2 secs. trace LE edema   NERVOUS SYSTEM:  A&Ox3, following commands, moving all extremities, no focal deficit    MEDICATIONS:  MEDICATIONS  (STANDING):  albuterol/ipratropium for Nebulization 3 milliLiter(s) Nebulizer every 6 hours  apixaban 5 milliGRAM(s) Oral every 12 hours  atorvastatin 20 milliGRAM(s) Oral at bedtime  benzonatate 100 milliGRAM(s) Oral every 8 hours  dextrose 5%. 1000 milliLiter(s) (100 mL/Hr) IV Continuous <Continuous>  dextrose 5%. 1000 milliLiter(s) (50 mL/Hr) IV Continuous <Continuous>  dextrose 50% Injectable 12.5 Gram(s) IV Push once  dextrose 50% Injectable 25 Gram(s) IV Push once  dextrose 50% Injectable 25 Gram(s) IV Push once  diltiazem    Tablet 30 milliGRAM(s) Oral four times a day  furosemide    Tablet 40 milliGRAM(s) Oral two times a day  glucagon  Injectable 1 milliGRAM(s) IntraMuscular once  influenza  Vaccine (HIGH DOSE) 0.7 milliLiter(s) IntraMuscular once  insulin lispro (ADMELOG) corrective regimen sliding scale   SubCutaneous at bedtime  insulin lispro (ADMELOG) corrective regimen sliding scale   SubCutaneous three times a day before meals  metoprolol tartrate 100 milliGRAM(s) Oral two times a day  sacubitril 49 mG/valsartan 51 mG 1 Tablet(s) Oral two times a day  senna 2 Tablet(s) Oral at bedtime  spironolactone 25 milliGRAM(s) Oral daily    MEDICATIONS  (PRN):  acetaminophen     Tablet .. 650 milliGRAM(s) Oral every 6 hours PRN Mild Pain (1 - 3), Moderate Pain (4 - 6)  dextrose Oral Gel 15 Gram(s) Oral once PRN Blood Glucose LESS THAN 70 milliGRAM(s)/deciliter  melatonin 3 milliGRAM(s) Oral at bedtime PRN Insomnia      ALLERGIES:  Allergies    No Known Allergies    Intolerances        LABS:    10-10    138  |  96  |  29<H>  ----------------------------<  113<H>  3.9   |  39<H>  |  1.02    Ca    8.7      10 Oct 2023 07:22  Phos  3.7     10-10  Mg     2.2     10-10    TPro  7.4  /  Alb  3.2<L>  /  TBili  0.6  /  DBili  x   /  AST  13<L>  /  ALT  36  /  AlkPhos  90  10-10      Urinalysis Basic - ( 10 Oct 2023 07:22 )    Color: x / Appearance: x / SG: x / pH: x  Gluc: 113 mg/dL / Ketone: x  / Bili: x / Urobili: x   Blood: x / Protein: x / Nitrite: x   Leuk Esterase: x / RBC: x / WBC x   Sq Epi: x / Non Sq Epi: x / Bacteria: x      Micro:    Culture - Blood (collected 10-02-23 @ 15:15)  Source: .Blood Blood-Peripheral  Final Report (10-07-23 @ 20:00):    No growth at 5 days    Culture - Blood (collected 10-02-23 @ 15:00)  Source: .Blood Blood-Peripheral  Final Report (10-07-23 @ 20:00):    No growth at 5 days          RADIOLOGY & ADDITIONAL TESTS: Reviewed.

## 2023-10-11 LAB
ANION GAP SERPL CALC-SCNC: 5 MMOL/L — SIGNIFICANT CHANGE UP (ref 5–17)
BUN SERPL-MCNC: 29 MG/DL — HIGH (ref 7–23)
CALCIUM SERPL-MCNC: 8.7 MG/DL — SIGNIFICANT CHANGE UP (ref 8.5–10.1)
CHLORIDE SERPL-SCNC: 97 MMOL/L — SIGNIFICANT CHANGE UP (ref 96–108)
CO2 SERPL-SCNC: 37 MMOL/L — HIGH (ref 22–31)
CREAT SERPL-MCNC: 0.97 MG/DL — SIGNIFICANT CHANGE UP (ref 0.5–1.3)
EGFR: 61 ML/MIN/1.73M2 — SIGNIFICANT CHANGE UP
GLUCOSE BLDC GLUCOMTR-MCNC: 109 MG/DL — HIGH (ref 70–99)
GLUCOSE BLDC GLUCOMTR-MCNC: 132 MG/DL — HIGH (ref 70–99)
GLUCOSE BLDC GLUCOMTR-MCNC: 143 MG/DL — HIGH (ref 70–99)
GLUCOSE BLDC GLUCOMTR-MCNC: 186 MG/DL — HIGH (ref 70–99)
GLUCOSE SERPL-MCNC: 132 MG/DL — HIGH (ref 70–99)
HCT VFR BLD CALC: 34.7 % — SIGNIFICANT CHANGE UP (ref 34.5–45)
HGB BLD-MCNC: 10.5 G/DL — LOW (ref 11.5–15.5)
MAGNESIUM SERPL-MCNC: 2.3 MG/DL — SIGNIFICANT CHANGE UP (ref 1.6–2.6)
MCHC RBC-ENTMCNC: 30.3 G/DL — LOW (ref 32–36)
MCHC RBC-ENTMCNC: 31 PG — SIGNIFICANT CHANGE UP (ref 27–34)
MCV RBC AUTO: 102.4 FL — HIGH (ref 80–100)
NRBC # BLD: 0 /100 WBCS — SIGNIFICANT CHANGE UP (ref 0–0)
PLATELET # BLD AUTO: 284 K/UL — SIGNIFICANT CHANGE UP (ref 150–400)
POTASSIUM SERPL-MCNC: 3.9 MMOL/L — SIGNIFICANT CHANGE UP (ref 3.5–5.3)
POTASSIUM SERPL-SCNC: 3.9 MMOL/L — SIGNIFICANT CHANGE UP (ref 3.5–5.3)
RBC # BLD: 3.39 M/UL — LOW (ref 3.8–5.2)
RBC # FLD: 12.4 % — SIGNIFICANT CHANGE UP (ref 10.3–14.5)
SODIUM SERPL-SCNC: 139 MMOL/L — SIGNIFICANT CHANGE UP (ref 135–145)
WBC # BLD: 6.33 K/UL — SIGNIFICANT CHANGE UP (ref 3.8–10.5)
WBC # FLD AUTO: 6.33 K/UL — SIGNIFICANT CHANGE UP (ref 3.8–10.5)

## 2023-10-11 PROCEDURE — 99232 SBSQ HOSP IP/OBS MODERATE 35: CPT

## 2023-10-11 RX ADMIN — Medication 30 MILLIGRAM(S): at 05:05

## 2023-10-11 RX ADMIN — APIXABAN 5 MILLIGRAM(S): 2.5 TABLET, FILM COATED ORAL at 18:03

## 2023-10-11 RX ADMIN — Medication 100 MILLIGRAM(S): at 18:04

## 2023-10-11 RX ADMIN — APIXABAN 5 MILLIGRAM(S): 2.5 TABLET, FILM COATED ORAL at 05:06

## 2023-10-11 RX ADMIN — Medication 30 MILLIGRAM(S): at 18:03

## 2023-10-11 RX ADMIN — SACUBITRIL AND VALSARTAN 1 TABLET(S): 24; 26 TABLET, FILM COATED ORAL at 05:06

## 2023-10-11 RX ADMIN — ATORVASTATIN CALCIUM 20 MILLIGRAM(S): 80 TABLET, FILM COATED ORAL at 21:26

## 2023-10-11 RX ADMIN — Medication 100 MILLIGRAM(S): at 05:06

## 2023-10-11 RX ADMIN — Medication 100 MILLIGRAM(S): at 14:57

## 2023-10-11 RX ADMIN — Medication 100 MILLIGRAM(S): at 21:27

## 2023-10-11 RX ADMIN — Medication 3 MILLILITER(S): at 16:37

## 2023-10-11 RX ADMIN — Medication 30 MILLIGRAM(S): at 11:33

## 2023-10-11 RX ADMIN — Medication 3 MILLILITER(S): at 05:13

## 2023-10-11 RX ADMIN — SPIRONOLACTONE 25 MILLIGRAM(S): 25 TABLET, FILM COATED ORAL at 05:05

## 2023-10-11 RX ADMIN — Medication 40 MILLIGRAM(S): at 14:57

## 2023-10-11 RX ADMIN — SACUBITRIL AND VALSARTAN 1 TABLET(S): 24; 26 TABLET, FILM COATED ORAL at 18:04

## 2023-10-11 RX ADMIN — Medication 40 MILLIGRAM(S): at 05:57

## 2023-10-11 RX ADMIN — SENNA PLUS 2 TABLET(S): 8.6 TABLET ORAL at 21:27

## 2023-10-11 NOTE — DIETITIAN NUTRITION RISK NOTIFICATION - TREATMENT: THE FOLLOWING DIET HAS BEEN RECOMMENDED
Diet, DASH/TLC:   Sodium & Cholesterol Restricted  Consistent Carbohydrate {Evening Snack}  1500mL Fluid Restriction ([id number]) (10-11-23 @ 12:02) [Pending Verification By Attending]  Diet, DASH/TLC:   Sodium & Cholesterol Restricted  Consistent Carbohydrate {Evening Snack} (10-04-23 @ 15:18) [Active]

## 2023-10-11 NOTE — PROGRESS NOTE ADULT - ASSESSMENT
78 years old female with pmhhx of morbid obesity, HTN, HLD, CHF, A. fib, COPD, LEIDY presented w/ dyspnea due to acute respiratory failure with hypoxia and hypercapnia due to acute on chronic systolic congestive heart failure and CAP, initially requiring BIPAP but now on 0-2L NC. Hospital course complicated by afib w/ RVR       # Acute on chronic systolic congestive heart failure   continue with medication regimen    - c/w BiPAP QHS  # afib-rate controlled continue with ac and cardizem     # HTN- bp stable  # HLD- continue with statin    # acute hypoxemic respiratory failure  - c/w BiPAP QHS  # CAP- s/p antibx     # pulmonary nodule-4mm left nodule at apex   will need outpt f/u with pulmonary and subsequent scan     # Euthyroid sick syndrome  - TSH is 0.133, free thyroxine is 1.2     Dispo: [facility] - in process of obtaining authorization

## 2023-10-11 NOTE — CHART NOTE - NSCHARTNOTEFT_GEN_A_CORE
Assessment:  Pt adm c diagnosis of CHF acute exacerbation, CAP, Afib, euthyroid sick syndrome,   PMH include sleep apnea, Afib, morbid obesity, CHF, HTN, COPD, LEIDY.  Pt without hx of DM(on ADMELOG sliding scale coverage).    Pt demonstrated knowledge deficit, was able to state that prepared foods, processed meats are high in sodium.  Pt was not limiting fluid intake.  Pt noted c missing teeth, without complaint of altered chew/swallow.  D/C plans for Rehab noted.  Pt provided c education for heart failure nutrition therapy, fluid limitations.    Factors impacting intake: [ x] none [ ] nausea  [ ] vomiting [ ] diarrhea [ ] constipation  [ ]chewing problems [ ] swallowing issues  [ ] other:     Diet Prescription: Diet, DASH/TLC:   Sodium & Cholesterol Restricted  Consistent Carbohydrate {Evening Snack} (10-04-23 @ 15:18)    Intake: >75%    Current Weight: 10/103, 109.1 kg, 10/04, 97.7 kg, 10/10, 109.8 kg, wt. fluctuations noted since adm, ? wt. accuracy, c wt. gain of 0.8 kg since adm as per current wt.  % Weight Change: 0.7%  10/07, 3+(moderate) edema of ankles noted  Pt c reports usual wt. of 237 LBS(107.7 kg) PTA/(BMI=41.9 per reported usual wt.  As per adm wt./drug dose wt. BMI=42.6(morbidly obese)    Pertinent Medications: MEDICATIONS  (STANDING):  apixaban 5 milliGRAM(s) Oral every 12 hours  atorvastatin 20 milliGRAM(s) Oral at bedtime  benzonatate 100 milliGRAM(s) Oral every 8 hours  dextrose 5%. 1000 milliLiter(s) (100 mL/Hr) IV Continuous <Continuous>  dextrose 5%. 1000 milliLiter(s) (50 mL/Hr) IV Continuous <Continuous>  dextrose 50% Injectable 12.5 Gram(s) IV Push once  dextrose 50% Injectable 25 Gram(s) IV Push once  dextrose 50% Injectable 25 Gram(s) IV Push once  diltiazem    Tablet 30 milliGRAM(s) Oral four times a day  furosemide    Tablet 40 milliGRAM(s) Oral two times a day  glucagon  Injectable 1 milliGRAM(s) IntraMuscular once  influenza  Vaccine (HIGH DOSE) 0.7 milliLiter(s) IntraMuscular once  insulin lispro (ADMELOG) corrective regimen sliding scale   SubCutaneous three times a day before meals  insulin lispro (ADMELOG) corrective regimen sliding scale   SubCutaneous at bedtime  metoprolol tartrate 100 milliGRAM(s) Oral two times a day  sacubitril 49 mG/valsartan 51 mG 1 Tablet(s) Oral two times a day  senna 2 Tablet(s) Oral at bedtime  spironolactone 25 milliGRAM(s) Oral daily    MEDICATIONS  (PRN):  acetaminophen     Tablet .. 650 milliGRAM(s) Oral every 6 hours PRN Mild Pain (1 - 3), Moderate Pain (4 - 6)  albuterol/ipratropium for Nebulization 3 milliLiter(s) Nebulizer every 6 hours PRN Shortness of Breath and/or Wheezing  dextrose Oral Gel 15 Gram(s) Oral once PRN Blood Glucose LESS THAN 70 milliGRAM(s)/deciliter  melatonin 3 milliGRAM(s) Oral at bedtime PRN Insomnia    Pertinent Labs: 10-11 Na139 mmol/L Glu 132 mg/dL<H> K+ 3.9 mmol/L Cr  0.97 mg/dL BUN 29 mg/dL<H> 10-10 Phos 3.7 mg/dL 10-10 Alb 3.2 g/dL<L> 10-03 Chol 158 mg/dL LDL --    HDL 51 mg/dL Trig 83 mg/dL10-10 ALT 36 U/L AST 13 U/L<L> Alkaline Phosphatase 90 U/L  10/08, T3 66 ng/dL <L>  10-03-23 @ 07:00 a1c 5.6<WNL>    10-03 Chol 158 LDL -- HDL 51 Trig 83   CAPILLARY BLOOD GLUCOSE      POCT Blood Glucose.: 143 mg/dL (11 Oct 2023 07:42)  POCT Blood Glucose.: 135 mg/dL (10 Oct 2023 20:51)  POCT Blood Glucose.: 117 mg/dL (10 Oct 2023 16:41)    Skin: 10/11, WDL    Estimated Needs:   [ x] no change since previous assessment(10/04)  [ ] recalculated:     Previous Nutrition Diagnosis: (10/04)  Food & Nutrition Related Knowledge Deficit  Etiology: Lack of previous nutrition education regarding recommended diet  Signs/Symptoms: Pt verbalizes lack of previous nutrition education regarding recommended diet  Goal/Expected Outcome: Pt to verbalize 3 foods high in Na to avoid; Pt to consume sodium-controlled meals during LOS; partially met   Nutrition Diagnosis is [ x] ongoing  [ ] resolved [ ] not applicable       New Nutrition Diagnosis: [ x] not applicable     Interventions:   Recommend  [x ] Change Diet To: DASH/ TLC, consistent carbohydrate c evening snack, 1500 ml fluid restriction   [ ] Nutrition Supplement  [ ] Nutrition Support  [x ] Other: monitor for altered chew/swallow     Monitoring and Evaluation:   [x ] PO intake [ x ] Tolerance to diet prescription [ x ] weights [ x ] labs; Glucose, A1C%, thyroid functions [ x ] follow up per protocol  [ ] other:

## 2023-10-11 NOTE — PROGRESS NOTE ADULT - SUBJECTIVE AND OBJECTIVE BOX
Patient is a 78y old  Female who presents with a chief complaint of acute respiratory failure with hypoxia/hypercapnia, CHF exacerbation, Afib with RVR (09 Oct 2023 18:51)      INTERVAL HPI/OVERNIGHT EVENTS:       MEDICATIONS  (STANDING):  apixaban 5 milliGRAM(s) Oral every 12 hours  atorvastatin 20 milliGRAM(s) Oral at bedtime  benzonatate 100 milliGRAM(s) Oral every 8 hours  dextrose 5%. 1000 milliLiter(s) (100 mL/Hr) IV Continuous <Continuous>  dextrose 5%. 1000 milliLiter(s) (50 mL/Hr) IV Continuous <Continuous>  dextrose 50% Injectable 12.5 Gram(s) IV Push once  dextrose 50% Injectable 25 Gram(s) IV Push once  dextrose 50% Injectable 25 Gram(s) IV Push once  diltiazem    Tablet 30 milliGRAM(s) Oral four times a day  furosemide    Tablet 40 milliGRAM(s) Oral two times a day  glucagon  Injectable 1 milliGRAM(s) IntraMuscular once  influenza  Vaccine (HIGH DOSE) 0.7 milliLiter(s) IntraMuscular once  insulin lispro (ADMELOG) corrective regimen sliding scale   SubCutaneous at bedtime  insulin lispro (ADMELOG) corrective regimen sliding scale   SubCutaneous three times a day before meals  metoprolol tartrate 100 milliGRAM(s) Oral two times a day  sacubitril 49 mG/valsartan 51 mG 1 Tablet(s) Oral two times a day  senna 2 Tablet(s) Oral at bedtime  spironolactone 25 milliGRAM(s) Oral daily    MEDICATIONS  (PRN):  acetaminophen     Tablet .. 650 milliGRAM(s) Oral every 6 hours PRN Mild Pain (1 - 3), Moderate Pain (4 - 6)  albuterol/ipratropium for Nebulization 3 milliLiter(s) Nebulizer every 6 hours PRN Shortness of Breath and/or Wheezing  dextrose Oral Gel 15 Gram(s) Oral once PRN Blood Glucose LESS THAN 70 milliGRAM(s)/deciliter  melatonin 3 milliGRAM(s) Oral at bedtime PRN Insomnia        Allergies    No Known Allergies    Intolerances      Vital Signs Last 24 Hrs  T(C): 36.8 (11 Oct 2023 11:01), Max: 36.8 (11 Oct 2023 11:01)  T(F): 98.3 (11 Oct 2023 11:01), Max: 98.3 (11 Oct 2023 11:01)  HR: 75 (11 Oct 2023 11:29) (75 - 100)  BP: 135/95 (11 Oct 2023 11:29) (106/65 - 150/96)  BP(mean): --  RR: 18 (11 Oct 2023 11:01) (18 - 19)  SpO2: 94% (11 Oct 2023 11:01) (90% - 98%)    Parameters below as of 11 Oct 2023 08:00  Patient On (Oxygen Delivery Method): nasal cannula      PHYSICAL EXAM:  GENERAL: NAD, well-groomed, well-developed  HEAD:  Atraumatic, Normocephalic  EYES: EOMI, PERRLA, conjunctiva and sclera clear  ENMT: No tonsillar erythema, exudates, or enlargement; Moist mucous membranes, Good dentition, No lesions  NECK: Supple,   NERVOUS SYSTEM:  Alert & Oriented X3, Good concentration; Motor Strength 5/5 B/L upper and lower extremities; DTRs 2+ intact and symmetric  CHEST/LUNG: Clear to percussion bilaterally; No rales, rhonchi, wheezing, or rubs  HEART: Regular rate and rhythm; No murmurs, rubs, or gallops  ABDOMEN: Soft, Nontender, Nondistended; Bowel sounds present  EXTREMITIES:  2+ Peripheral Pulses, No clubbing, cyanosis, or edema  SKIN: No rashes or lesions    LABS:                        10.5   6.33  )-----------( 284      ( 11 Oct 2023 07:10 )             34.7   10-11    139  |  97  |  29<H>  ----------------------------<  132<H>  3.9   |  37<H>  |  0.97    Ca    8.7      11 Oct 2023 07:10  Phos  3.7     10-10  Mg     2.3     10-11    TPro  7.4  /  Alb  3.2<L>  /  TBili  0.6  /  DBili  x   /  AST  13<L>  /  ALT  36  /  AlkPhos  90  10-10        Urinalysis Basic - ( 10 Oct 2023 07:22 )    Color: x / Appearance: x / SG: x / pH: x  Gluc: 113 mg/dL / Ketone: x  / Bili: x / Urobili: x   Blood: x / Protein: x / Nitrite: x   Leuk Esterase: x / RBC: x / WBC x   Sq Epi: x / Non Sq Epi: x / Bacteria: x      CAPILLARY BLOOD GLUCOSE        POCT Blood Glucose.: 132 mg/dL (11 Oct 2023 11:18)  POCT Blood Glucose.: 143 mg/dL (11 Oct 2023 07:42)  POCT Blood Glucose.: 135 mg/dL (10 Oct 2023 20:51)  POCT Blood Glucose.: 117 mg/dL (10 Oct 2023 16:41)      RADIOLOGY & ADDITIONAL TESTS:  < from: TTE Echo Complete w/o Contrast w/ Doppler (10.03.23 @ 12:02) >  Summary:   1. Left ventricular ejection fraction, by visual estimation, is 45 to   50%.   2. Mildly decreased global left ventricular systolic function.   3. Mildly enlarged right ventricle.   4. No evidence of mitral valve regurgitation.   5. Moderate tricuspid regurgitation.   6. Estimated pulmonary artery systolic pressure is 48.3 mmHg assuming a   right atrial pressure of 5 mmHg, which is consistent with moderate   pulmonary hypertension.      < end of copied text >      Imaging Personally Reviewed:  [ ] YES  [ ] NO    Consultant(s) Notes Reviewed:  [ ] YES  [ ] NO    Care Discussed with Consultants/Other Providers [ ] YES  [ ] NO Patient is a 78y old  Female who presents with a chief complaint of acute respiratory failure with hypoxia/hypercapnia, CHF exacerbation, Afib with RVR (09 Oct 2023 18:51)      INTERVAL HPI/OVERNIGHT EVENTS:       MEDICATIONS  (STANDING):  apixaban 5 milliGRAM(s) Oral every 12 hours  atorvastatin 20 milliGRAM(s) Oral at bedtime  benzonatate 100 milliGRAM(s) Oral every 8 hours  dextrose 5%. 1000 milliLiter(s) (100 mL/Hr) IV Continuous <Continuous>  dextrose 5%. 1000 milliLiter(s) (50 mL/Hr) IV Continuous <Continuous>  dextrose 50% Injectable 12.5 Gram(s) IV Push once  dextrose 50% Injectable 25 Gram(s) IV Push once  dextrose 50% Injectable 25 Gram(s) IV Push once  diltiazem    Tablet 30 milliGRAM(s) Oral four times a day  furosemide    Tablet 40 milliGRAM(s) Oral two times a day  glucagon  Injectable 1 milliGRAM(s) IntraMuscular once  influenza  Vaccine (HIGH DOSE) 0.7 milliLiter(s) IntraMuscular once  insulin lispro (ADMELOG) corrective regimen sliding scale   SubCutaneous at bedtime  insulin lispro (ADMELOG) corrective regimen sliding scale   SubCutaneous three times a day before meals  metoprolol tartrate 100 milliGRAM(s) Oral two times a day  sacubitril 49 mG/valsartan 51 mG 1 Tablet(s) Oral two times a day  senna 2 Tablet(s) Oral at bedtime  spironolactone 25 milliGRAM(s) Oral daily    MEDICATIONS  (PRN):  acetaminophen     Tablet .. 650 milliGRAM(s) Oral every 6 hours PRN Mild Pain (1 - 3), Moderate Pain (4 - 6)  albuterol/ipratropium for Nebulization 3 milliLiter(s) Nebulizer every 6 hours PRN Shortness of Breath and/or Wheezing  dextrose Oral Gel 15 Gram(s) Oral once PRN Blood Glucose LESS THAN 70 milliGRAM(s)/deciliter  melatonin 3 milliGRAM(s) Oral at bedtime PRN Insomnia        Allergies    No Known Allergies    Intolerances      Vital Signs Last 24 Hrs  T(C): 36.8 (11 Oct 2023 11:01), Max: 36.8 (11 Oct 2023 11:01)  T(F): 98.3 (11 Oct 2023 11:01), Max: 98.3 (11 Oct 2023 11:01)  HR: 75 (11 Oct 2023 11:29) (75 - 100)  BP: 135/95 (11 Oct 2023 11:29) (106/65 - 150/96)  BP(mean): --  RR: 18 (11 Oct 2023 11:01) (18 - 19)  SpO2: 94% (11 Oct 2023 11:01) (90% - 98%)    Parameters below as of 11 Oct 2023 08:00  Patient On (Oxygen Delivery Method): nasal cannula      PHYSICAL EXAM:  GENERAL: NAD, well-groomed, well-developed  HEAD:  Atraumatic, Normocephalic  EYES: EOMI, PERRLA, conjunctiva and sclera clear  ENMT: No tonsillar erythema, exudates, or enlargement; Moist mucous membranes, Good dentition, No lesions  NECK: Supple,   NERVOUS SYSTEM:  Alert & Oriented X3, Good concentration; Motor Strength 5/5 B/L upper and lower extremities; DTRs 2+ intact and symmetric  CHEST/LUNG: mild scattered wheezing   HEART: Regular rate and rhythm; No murmurs, rubs, or gallops  ABDOMEN: Soft, Nontender, Nondistended; Bowel sounds present  EXTREMITIES:  2+ Peripheral Pulses, No clubbing, cyanosis, +edema  SKIN: No rashes or lesions    LABS:                        10.5   6.33  )-----------( 284      ( 11 Oct 2023 07:10 )             34.7   10-11    139  |  97  |  29<H>  ----------------------------<  132<H>  3.9   |  37<H>  |  0.97    Ca    8.7      11 Oct 2023 07:10  Phos  3.7     10-10  Mg     2.3     10-11    TPro  7.4  /  Alb  3.2<L>  /  TBili  0.6  /  DBili  x   /  AST  13<L>  /  ALT  36  /  AlkPhos  90  10-10        Urinalysis Basic - ( 10 Oct 2023 07:22 )    Color: x / Appearance: x / SG: x / pH: x  Gluc: 113 mg/dL / Ketone: x  / Bili: x / Urobili: x   Blood: x / Protein: x / Nitrite: x   Leuk Esterase: x / RBC: x / WBC x   Sq Epi: x / Non Sq Epi: x / Bacteria: x      CAPILLARY BLOOD GLUCOSE        POCT Blood Glucose.: 132 mg/dL (11 Oct 2023 11:18)  POCT Blood Glucose.: 143 mg/dL (11 Oct 2023 07:42)  POCT Blood Glucose.: 135 mg/dL (10 Oct 2023 20:51)  POCT Blood Glucose.: 117 mg/dL (10 Oct 2023 16:41)      RADIOLOGY & ADDITIONAL TESTS:  < from: TTE Echo Complete w/o Contrast w/ Doppler (10.03.23 @ 12:02) >  Summary:   1. Left ventricular ejection fraction, by visual estimation, is 45 to   50%.   2. Mildly decreased global left ventricular systolic function.   3. Mildly enlarged right ventricle.   4. No evidence of mitral valve regurgitation.   5. Moderate tricuspid regurgitation.   6. Estimated pulmonary artery systolic pressure is 48.3 mmHg assuming a   right atrial pressure of 5 mmHg, which is consistent with moderate   pulmonary hypertension.      < end of copied text >      Imaging Personally Reviewed:  [ ] YES  [ ] NO    Consultant(s) Notes Reviewed:  [ ] YES  [ ] NO    Care Discussed with Consultants/Other Providers [ ] YES  [ ] NO

## 2023-10-11 NOTE — PROGRESS NOTE ADULT - SUBJECTIVE AND OBJECTIVE BOX
CHIEF COMPLAINT:  ===============  shortness of breath , CHF exacerbation     INTERVAL EVENTS:  ==================    - remains afebrile    T(F): , Max: 98.3 (10-11-23 @ 11:01)  RR: 18 (10-11-23 @ 16:09)  SpO2: 98% (10-11-23 @ 16:40)    REVIEW OF SYSTEMS:  ==================  - no fever, no chills  - no HA, no dizziness  - no visual changes, no auditory changes  - no sore throat, no sinus congestion  - no SOB, no cough  - no chest pain, no palpitations  - no abdominal pain, no N/V/D  - no dysuria, no hematuria  - no myalgias, no arthralgias  - no pain in extremity, no swelling   - no rashes, no pruritis  - no neuro deficits  - no psychiatric concerns       HPI:  HPI:  78 years old female with h/o HTN, HLD, CHF, Afib, obesity, asthma, LEIDY present to ED with complain of SOB. Patient reported SOB for 1 day, associated with orthopnea. Patient also feel chest congestion with cough for 1 day. No fever, chills, flu like symptoms, nausea, vomiting or diarrhea. Patient reproted missing diuretic for last few days.   Noted to be in Afib with RVR. Hypoxic to 88% at RA with respiratory distress required BiPAP support. No leukocytosis, plt 283, glucose 301, hsTnT 21.5, proBNP 1765, Flu/COVID negative. CXR with pul congestion. Superimposed airspace opacities in lung bases.    SH: no toxic habits (02 Oct 2023 11:51)      OBJECTIVE:  ===========    ICU Vital Signs Last 24 Hrs  T(C): 36.6 (11 Oct 2023 16:09), Max: 36.8 (11 Oct 2023 11:01)  T(F): 97.8 (11 Oct 2023 16:09), Max: 98.3 (11 Oct 2023 11:01)  HR: 58 (11 Oct 2023 16:40) (52 - 100)  BP: 136/87 (11 Oct 2023 16:09) (111/61 - 151/95)  RR: 18 (11 Oct 2023 16:09) (18 - 18)  SpO2: 98% (11 Oct 2023 16:40) (94% - 98%)    O2 Parameters below as of 11 Oct 2023 16:38  Patient On (Oxygen Delivery Method): nasal cannula        10-10 @ 07:01  -  10-11 @ 07:00  --------------------------------------------------------  IN: 400 mL / OUT: 1500 mL / NET: -1100 mL    10-11 @ 07:01  -  10-11 @ 16:57  --------------------------------------------------------  IN: 600 mL / OUT: 0 mL / NET: 600 mL      CAPILLARY BLOOD GLUCOSE  POCT Blood Glucose.: 109 mg/dL (11 Oct 2023 16:42)    PHYSICAL EXAM:  ==============  General: alert awake   HEENT:   Respiratory: CTA B/L   Cardiovascular: S1S2 RRR   Abdomen: soft + BS   Extremities:   Skin: Intact   Neurological: no deficits       HOSPITAL MEDICATIONS:  ======================  apixaban 5 milliGRAM(s) Oral every 12 hours  diltiazem    Tablet 30 milliGRAM(s) Oral four times a day  furosemide    Tablet 40 milliGRAM(s) Oral two times a day  metoprolol tartrate 100 milliGRAM(s) Oral two times a day  sacubitril 49 mG/valsartan 51 mG 1 Tablet(s) Oral two times a day  spironolactone 25 milliGRAM(s) Oral daily  atorvastatin 20 milliGRAM(s) Oral at bedtime  dextrose 50% Injectable 12.5 Gram(s) IV Push once  dextrose 50% Injectable 25 Gram(s) IV Push once  dextrose 50% Injectable 25 Gram(s) IV Push once  dextrose Oral Gel 15 Gram(s) Oral once PRN  glucagon  Injectable 1 milliGRAM(s) IntraMuscular once  insulin lispro (ADMELOG) corrective regimen sliding scale   SubCutaneous three times a day before meals  insulin lispro (ADMELOG) corrective regimen sliding scale   SubCutaneous at bedtime  albuterol/ipratropium for Nebulization 3 milliLiter(s) Nebulizer every 6 hours PRN  benzonatate 100 milliGRAM(s) Oral every 8 hours  acetaminophen     Tablet .. 650 milliGRAM(s) Oral every 6 hours PRN  melatonin 3 milliGRAM(s) Oral at bedtime PRN    senna 2 Tablet(s) Oral at bedtime        dextrose 5%. 1000 milliLiter(s) IV Continuous <Continuous>  dextrose 5%. 1000 milliLiter(s) IV Continuous <Continuous>    influenza  Vaccine (HIGH DOSE) 0.7 milliLiter(s) IntraMuscular once          LABS:  =====                        10.5   6.33  )-----------( 284      ( 11 Oct 2023 07:10 )             34.7     Hgb Trend: 10.5<--, 11.1<--, 10.5<--, 10.7<--  10-11    139  |  97  |  29<H>  ----------------------------<  132<H>  3.9   |  37<H>  |  0.97    Ca    8.7      11 Oct 2023 07:10  Phos  3.7     10-10  Mg     2.3     10-11    TPro  7.4  /  Alb  3.2<L>  /  TBili  0.6  /  DBili  x   /  AST  13<L>  /  ALT  36  /  AlkPhos  90  10-10    Creatinine Trend: 0.97<--, 1.02<--, 0.89<--, 0.94<--, 0.91<--, 0.92<--      Urinalysis Basic - ( 11 Oct 2023 07:10 )    Color: x / Appearance: x / SG: x / pH: x  Gluc: 132 mg/dL / Ketone: x  / Bili: x / Urobili: x   Blood: x / Protein: x / Nitrite: x   Leuk Esterase: x / RBC: x / WBC x   Sq Epi: x / Non Sq Epi: x / Bacteria: x              MICROBIOLOGY:     Culture - Blood (collected 10-02-23 @ 15:15)  Source: .Blood Blood-Peripheral  Final Report (10-07-23 @ 20:00):    No growth at 5 days    Culture - Blood (collected 10-02-23 @ 15:00)  Source: .Blood Blood-Peripheral  Final Report (10-07-23 @ 20:00):    No growth at 5 days    azithromycin  IVPB 500 every 24 hours  azithromycin  IVPB 500 every 24 hours  azithromycin  IVPB 500 every 24 hours  cefTRIAXone   IVPB 1000 every 24 hours      RADIOLOGY:  ==========   CT Chest No Cont (10.02.23 @ 12:25) >  IMPRESSION:  Groundglass opacities likely due to pulmonary edema in the setting of   mild septal thickening, small pleural effusions and cardiomegaly.    Mosaic lung parenchyma with suspected air trapping.    PULMONARY:  ============    albuterol/ipratropium for Nebulization 3 Nebulizer every 6 hours PRN  albuterol/ipratropium for Nebulization 3 Nebulizer every 20 minutes  albuterol/ipratropium for Nebulization 3 Nebulizer every 6 hours PRN  albuterol/ipratropium for Nebulization 3 Nebulizer every 6 hours  benzonatate 100 Oral every 8 hours  guaiFENesin Oral Liquid (Sugar-Free) 200 Oral every 6 hours PRN      CARDIAC:  ========

## 2023-10-12 LAB
FLUAV AG NPH QL: SIGNIFICANT CHANGE UP
FLUBV AG NPH QL: SIGNIFICANT CHANGE UP
GLUCOSE BLDC GLUCOMTR-MCNC: 102 MG/DL — HIGH (ref 70–99)
GLUCOSE BLDC GLUCOMTR-MCNC: 109 MG/DL — HIGH (ref 70–99)
GLUCOSE BLDC GLUCOMTR-MCNC: 213 MG/DL — HIGH (ref 70–99)
GLUCOSE BLDC GLUCOMTR-MCNC: 97 MG/DL — SIGNIFICANT CHANGE UP (ref 70–99)
GLUCOSE BLDC GLUCOMTR-MCNC: 97 MG/DL — SIGNIFICANT CHANGE UP (ref 70–99)
SARS-COV-2 RNA SPEC QL NAA+PROBE: SIGNIFICANT CHANGE UP

## 2023-10-12 PROCEDURE — 99231 SBSQ HOSP IP/OBS SF/LOW 25: CPT

## 2023-10-12 RX ADMIN — SENNA PLUS 2 TABLET(S): 8.6 TABLET ORAL at 21:05

## 2023-10-12 RX ADMIN — APIXABAN 5 MILLIGRAM(S): 2.5 TABLET, FILM COATED ORAL at 17:45

## 2023-10-12 RX ADMIN — Medication 30 MILLIGRAM(S): at 12:45

## 2023-10-12 RX ADMIN — APIXABAN 5 MILLIGRAM(S): 2.5 TABLET, FILM COATED ORAL at 05:20

## 2023-10-12 RX ADMIN — ATORVASTATIN CALCIUM 20 MILLIGRAM(S): 80 TABLET, FILM COATED ORAL at 21:05

## 2023-10-12 RX ADMIN — SACUBITRIL AND VALSARTAN 1 TABLET(S): 24; 26 TABLET, FILM COATED ORAL at 17:45

## 2023-10-12 RX ADMIN — SACUBITRIL AND VALSARTAN 1 TABLET(S): 24; 26 TABLET, FILM COATED ORAL at 05:19

## 2023-10-12 RX ADMIN — Medication 40 MILLIGRAM(S): at 05:20

## 2023-10-12 RX ADMIN — Medication 30 MILLIGRAM(S): at 17:45

## 2023-10-12 RX ADMIN — Medication 30 MILLIGRAM(S): at 00:14

## 2023-10-12 RX ADMIN — Medication 100 MILLIGRAM(S): at 21:05

## 2023-10-12 RX ADMIN — Medication 100 MILLIGRAM(S): at 05:19

## 2023-10-12 RX ADMIN — SPIRONOLACTONE 25 MILLIGRAM(S): 25 TABLET, FILM COATED ORAL at 05:20

## 2023-10-12 RX ADMIN — Medication 100 MILLIGRAM(S): at 17:45

## 2023-10-12 RX ADMIN — Medication 30 MILLIGRAM(S): at 05:19

## 2023-10-12 RX ADMIN — Medication 100 MILLIGRAM(S): at 05:20

## 2023-10-12 NOTE — PROGRESS NOTE ADULT - SUBJECTIVE AND OBJECTIVE BOX
Patient is a 78y old  Female who presents with a chief complaint of acute respiratory failure with hypoxia/hypercapnia, CHF exacerbation, Afib with RVR (09 Oct 2023 18:51)      INTERVAL HPI/OVERNIGHT EVENTS:         MEDICATIONS  (STANDING):  apixaban 5 milliGRAM(s) Oral every 12 hours  atorvastatin 20 milliGRAM(s) Oral at bedtime  benzonatate 100 milliGRAM(s) Oral every 8 hours  dextrose 5%. 1000 milliLiter(s) (50 mL/Hr) IV Continuous <Continuous>  dextrose 5%. 1000 milliLiter(s) (100 mL/Hr) IV Continuous <Continuous>  dextrose 50% Injectable 12.5 Gram(s) IV Push once  dextrose 50% Injectable 25 Gram(s) IV Push once  dextrose 50% Injectable 25 Gram(s) IV Push once  diltiazem    Tablet 30 milliGRAM(s) Oral four times a day  furosemide    Tablet 40 milliGRAM(s) Oral two times a day  glucagon  Injectable 1 milliGRAM(s) IntraMuscular once  influenza  Vaccine (HIGH DOSE) 0.7 milliLiter(s) IntraMuscular once  insulin lispro (ADMELOG) corrective regimen sliding scale   SubCutaneous three times a day before meals  insulin lispro (ADMELOG) corrective regimen sliding scale   SubCutaneous at bedtime  metoprolol tartrate 100 milliGRAM(s) Oral two times a day  sacubitril 49 mG/valsartan 51 mG 1 Tablet(s) Oral two times a day  senna 2 Tablet(s) Oral at bedtime  spironolactone 25 milliGRAM(s) Oral daily    MEDICATIONS  (PRN):  acetaminophen     Tablet .. 650 milliGRAM(s) Oral every 6 hours PRN Mild Pain (1 - 3), Moderate Pain (4 - 6)  albuterol/ipratropium for Nebulization 3 milliLiter(s) Nebulizer every 6 hours PRN Shortness of Breath and/or Wheezing  dextrose Oral Gel 15 Gram(s) Oral once PRN Blood Glucose LESS THAN 70 milliGRAM(s)/deciliter  melatonin 3 milliGRAM(s) Oral at bedtime PRN Insomnia        Allergies    No Known Allergies    Intolerances    Vital Signs Last 24 Hrs  T(C): 36.3 (12 Oct 2023 04:29), Max: 36.8 (11 Oct 2023 11:01)  T(F): 97.4 (12 Oct 2023 04:29), Max: 98.3 (11 Oct 2023 11:01)  HR: 78 (12 Oct 2023 05:46) (52 - 102)  BP: 158/74 (12 Oct 2023 04:29) (111/61 - 158/74)  BP(mean): --  RR: 18 (12 Oct 2023 04:29) (18 - 18)  SpO2: 100% (12 Oct 2023 05:46) (94% - 100%)    Parameters below as of 12 Oct 2023 05:46  Patient On (Oxygen Delivery Method): nasal cannula  O2 Flow (L/min): 2    PHYSICAL EXAM:  GENERAL: NAD, well-groomed, well-developed  HEAD:  Atraumatic, Normocephalic  EYES: EOMI, PERRLA, conjunctiva and sclera clear  ENMT: No tonsillar erythema, exudates, or enlargement; Moist mucous membranes, Good dentition, No lesions  NECK: Supple,   NERVOUS SYSTEM:  Alert & Oriented X3, Good concentration; Motor Strength 5/5 B/L upper and lower extremities; DTRs 2+ intact and symmetric  CHEST/LUNG: mild scattered wheezing   HEART: Regular rate and rhythm; No murmurs, rubs, or gallops  ABDOMEN: Soft, Nontender, Nondistended; Bowel sounds present  EXTREMITIES:  2+ Peripheral Pulses, No clubbing, cyanosis, +edema  SKIN: No rashes or lesions    LABS:                                   10.5   6.33  )-----------( 284      ( 11 Oct 2023 07:10 )             34.7   10-11    139  |  97  |  29<H>  ----------------------------<  132<H>  3.9   |  37<H>  |  0.97    Ca    8.7      11 Oct 2023 07:10  Mg     2.3     10-11        Urinalysis Basic - ( 10 Oct 2023 07:22 )    Color: x / Appearance: x / SG: x / pH: x  Gluc: 113 mg/dL / Ketone: x  / Bili: x / Urobili: x   Blood: x / Protein: x / Nitrite: x   Leuk Esterase: x / RBC: x / WBC x   Sq Epi: x / Non Sq Epi: x / Bacteria: x      CAPILLARY BLOOD GLUCOSE        POCT Blood Glucose.: 132 mg/dL (11 Oct 2023 11:18)  POCT Blood Glucose.: 143 mg/dL (11 Oct 2023 07:42)  POCT Blood Glucose.: 135 mg/dL (10 Oct 2023 20:51)  POCT Blood Glucose.: 117 mg/dL (10 Oct 2023 16:41)      RADIOLOGY & ADDITIONAL TESTS:  < from: TTE Echo Complete w/o Contrast w/ Doppler (10.03.23 @ 12:02) >  Summary:   1. Left ventricular ejection fraction, by visual estimation, is 45 to   50%.   2. Mildly decreased global left ventricular systolic function.   3. Mildly enlarged right ventricle.   4. No evidence of mitral valve regurgitation.   5. Moderate tricuspid regurgitation.   6. Estimated pulmonary artery systolic pressure is 48.3 mmHg assuming a   right atrial pressure of 5 mmHg, which is consistent with moderate   pulmonary hypertension.      < end of copied text >      Imaging Personally Reviewed:  [ ] YES  [ ] NO    Consultant(s) Notes Reviewed:  [ ] YES  [ ] NO    Care Discussed with Consultants/Other Providers [ ] YES  [ ] NO

## 2023-10-13 ENCOUNTER — TRANSCRIPTION ENCOUNTER (OUTPATIENT)
Age: 78
End: 2023-10-13

## 2023-10-13 LAB
GLUCOSE BLDC GLUCOMTR-MCNC: 114 MG/DL — HIGH (ref 70–99)
GLUCOSE BLDC GLUCOMTR-MCNC: 144 MG/DL — HIGH (ref 70–99)
GLUCOSE BLDC GLUCOMTR-MCNC: 199 MG/DL — HIGH (ref 70–99)
GLUCOSE BLDC GLUCOMTR-MCNC: 97 MG/DL — SIGNIFICANT CHANGE UP (ref 70–99)

## 2023-10-13 PROCEDURE — 99231 SBSQ HOSP IP/OBS SF/LOW 25: CPT

## 2023-10-13 RX ADMIN — APIXABAN 5 MILLIGRAM(S): 2.5 TABLET, FILM COATED ORAL at 05:28

## 2023-10-13 RX ADMIN — APIXABAN 5 MILLIGRAM(S): 2.5 TABLET, FILM COATED ORAL at 17:06

## 2023-10-13 RX ADMIN — Medication 100 MILLIGRAM(S): at 05:28

## 2023-10-13 RX ADMIN — Medication 100 MILLIGRAM(S): at 21:22

## 2023-10-13 RX ADMIN — Medication 100 MILLIGRAM(S): at 13:11

## 2023-10-13 RX ADMIN — Medication 30 MILLIGRAM(S): at 00:14

## 2023-10-13 RX ADMIN — SPIRONOLACTONE 25 MILLIGRAM(S): 25 TABLET, FILM COATED ORAL at 05:30

## 2023-10-13 RX ADMIN — ATORVASTATIN CALCIUM 20 MILLIGRAM(S): 80 TABLET, FILM COATED ORAL at 21:22

## 2023-10-13 RX ADMIN — Medication 40 MILLIGRAM(S): at 05:28

## 2023-10-13 RX ADMIN — Medication 100 MILLIGRAM(S): at 17:06

## 2023-10-13 RX ADMIN — Medication 30 MILLIGRAM(S): at 11:48

## 2023-10-13 RX ADMIN — Medication 30 MILLIGRAM(S): at 05:27

## 2023-10-13 RX ADMIN — SACUBITRIL AND VALSARTAN 1 TABLET(S): 24; 26 TABLET, FILM COATED ORAL at 05:27

## 2023-10-13 RX ADMIN — Medication 1: at 11:48

## 2023-10-13 RX ADMIN — Medication 30 MILLIGRAM(S): at 17:06

## 2023-10-13 RX ADMIN — SACUBITRIL AND VALSARTAN 1 TABLET(S): 24; 26 TABLET, FILM COATED ORAL at 17:06

## 2023-10-13 RX ADMIN — Medication 40 MILLIGRAM(S): at 13:11

## 2023-10-13 NOTE — DISCHARGE NOTE PROVIDER - DETAILS OF MALNUTRITION DIAGNOSIS/DIAGNOSES
This patient has been assessed with a concern for Malnutrition and was treated during this hospitalization for the following Nutrition diagnosis/diagnoses:     -  10/11/2023: Morbid obesity (BMI > 40)

## 2023-10-13 NOTE — PROGRESS NOTE ADULT - SUBJECTIVE AND OBJECTIVE BOX
Patient is a 78y old  Female who presents with a chief complaint of acute respiratory failure with hypoxia/hypercapnia, CHF exacerbation, Afib with RVR (09 Oct 2023 18:51)      INTERVAL HPI/OVERNIGHT EVENTS:     MEDICATIONS  (STANDING):  apixaban 5 milliGRAM(s) Oral every 12 hours  atorvastatin 20 milliGRAM(s) Oral at bedtime  benzonatate 100 milliGRAM(s) Oral every 8 hours  dextrose 5%. 1000 milliLiter(s) (50 mL/Hr) IV Continuous <Continuous>  dextrose 5%. 1000 milliLiter(s) (100 mL/Hr) IV Continuous <Continuous>  dextrose 50% Injectable 12.5 Gram(s) IV Push once  dextrose 50% Injectable 25 Gram(s) IV Push once  dextrose 50% Injectable 25 Gram(s) IV Push once  diltiazem    Tablet 30 milliGRAM(s) Oral four times a day  furosemide    Tablet 40 milliGRAM(s) Oral two times a day  glucagon  Injectable 1 milliGRAM(s) IntraMuscular once  influenza  Vaccine (HIGH DOSE) 0.7 milliLiter(s) IntraMuscular once  insulin lispro (ADMELOG) corrective regimen sliding scale   SubCutaneous at bedtime  insulin lispro (ADMELOG) corrective regimen sliding scale   SubCutaneous three times a day before meals  metoprolol tartrate 100 milliGRAM(s) Oral two times a day  sacubitril 49 mG/valsartan 51 mG 1 Tablet(s) Oral two times a day  senna 2 Tablet(s) Oral at bedtime  spironolactone 25 milliGRAM(s) Oral daily    MEDICATIONS  (PRN):  acetaminophen     Tablet .. 650 milliGRAM(s) Oral every 6 hours PRN Mild Pain (1 - 3), Moderate Pain (4 - 6)  albuterol/ipratropium for Nebulization 3 milliLiter(s) Nebulizer every 6 hours PRN Shortness of Breath and/or Wheezing  dextrose Oral Gel 15 Gram(s) Oral once PRN Blood Glucose LESS THAN 70 milliGRAM(s)/deciliter  melatonin 3 milliGRAM(s) Oral at bedtime PRN Insomnia      Allergies    No Known Allergies    Intolerances    Vital Signs Last 24 Hrs  T(C): 36.3 (13 Oct 2023 04:55), Max: 36.6 (12 Oct 2023 11:30)  T(F): 97.4 (13 Oct 2023 04:55), Max: 97.8 (12 Oct 2023 11:30)  HR: 70 (13 Oct 2023 05:44) (69 - 92)  BP: 152/72 (13 Oct 2023 04:55) (122/89 - 152/72)  BP(mean): --  RR: 18 (13 Oct 2023 04:55) (17 - 18)  SpO2: 96% (13 Oct 2023 05:44) (93% - 100%)    Parameters below as of 13 Oct 2023 04:55  Patient On (Oxygen Delivery Method): BiPAP/CPAP      PHYSICAL EXAM:  GENERAL: NAD, well-groomed, well-developed  HEAD:  Atraumatic, Normocephalic  EYES: EOMI, PERRLA, conjunctiva and sclera clear  ENMT: No tonsillar erythema, exudates, or enlargement; Moist mucous membranes, Good dentition, No lesions  NECK: Supple,   NERVOUS SYSTEM:  Alert & Oriented X3, Good concentration; Motor Strength 5/5 B/L upper and lower extremities; DTRs 2+ intact and symmetric  CHEST/LUNG: mild scattered wheezing   HEART: Regular rate and rhythm; No murmurs, rubs, or gallops  ABDOMEN: Soft, Nontender, Nondistended; Bowel sounds present  EXTREMITIES:  2+ Peripheral Pulses, No clubbing, cyanosis, +edema  SKIN: No rashes or lesions    LABS:                                  Urinalysis Basic - ( 10 Oct 2023 07:22 )    Color: x / Appearance: x / SG: x / pH: x  Gluc: 113 mg/dL / Ketone: x  / Bili: x / Urobili: x   Blood: x / Protein: x / Nitrite: x   Leuk Esterase: x / RBC: x / WBC x   Sq Epi: x / Non Sq Epi: x / Bacteria: x      CAPILLARY BLOOD GLUCOSE        POCT Blood Glucose.: 114 mg/dL (13 Oct 2023 07:37)  POCT Blood Glucose.: 213 mg/dL (12 Oct 2023 21:02)  POCT Blood Glucose.: 102 mg/dL (12 Oct 2023 17:04)  POCT Blood Glucose.: 97 mg/dL (12 Oct 2023 12:35)      RADIOLOGY & ADDITIONAL TESTS:  < from: TTE Echo Complete w/o Contrast w/ Doppler (10.03.23 @ 12:02) >  Summary:   1. Left ventricular ejection fraction, by visual estimation, is 45 to   50%.   2. Mildly decreased global left ventricular systolic function.   3. Mildly enlarged right ventricle.   4. No evidence of mitral valve regurgitation.   5. Moderate tricuspid regurgitation.   6. Estimated pulmonary artery systolic pressure is 48.3 mmHg assuming a   right atrial pressure of 5 mmHg, which is consistent with moderate   pulmonary hypertension.      < end of copied text >      Imaging Personally Reviewed:  [ ] YES  [ ] NO    Consultant(s) Notes Reviewed:  [ ] YES  [ ] NO    Care Discussed with Consultants/Other Providers [ ] YES  [ ] NO

## 2023-10-13 NOTE — DISCHARGE NOTE PROVIDER - DISCHARGE DIET
MEDICINE, PROGRESS NOTE [phone]    EVELIN CHAVEZ 85y [id number]    Patient seen and examined.  Patient is a 85y old  Male who presents with a chief complaint of Loss of Consciousness with resultant fall (20 Dec 2017 11:35)  pt pleasantly confused. denies any pain or complaints.    PAST MEDICAL & SURGICAL HISTORY:  Alzheimer disease: with pseudobubular affect  BPH (benign prostatic hypertrophy)  Renal calculi  Hyperlipidemia  GERD (gastroesophageal reflux disease)  Nephrolithiasis  H/O cataract extraction, right  S/P tonsillectomy    MEDICATIONS  (STANDING):  aspirin enteric coated 81 milliGRAM(s) Oral daily  cholecalciferol 1000 Unit(s) Oral two times a day  docusate sodium 100 milliGRAM(s) Oral three times a day  famotidine    Tablet 20 milliGRAM(s) Oral two times a day  heparin  Injectable 5000 Unit(s) SubCutaneous every 8 hours  influenza   Vaccine 0.5 milliLiter(s) IntraMuscular once  memantine 5 milliGRAM(s) Oral daily  memantine 10 milliGRAM(s) Oral at bedtime  midodrine 5 milliGRAM(s) Oral two times a day  multivitamin 1 Tablet(s) Oral daily  nystatin Powder 1 Application(s) Topical two times a day  polyethylene glycol 3350 17 Gram(s) Oral daily  QUEtiapine 25 milliGRAM(s) Oral at bedtime  sodium chloride 0.65% Nasal 1 Spray(s) Both Nostrils daily  tamsulosin 0.4 milliGRAM(s) Oral at bedtime    MEDICATIONS  (PRN):  acetaminophen   Tablet 650 milliGRAM(s) Oral every 6 hours PRN For Temp greater than 38 C (100.4 F)  ALBUTerol/ipratropium for Nebulization 3 milliLiter(s) Nebulizer every 6 hours PRN Shortness of Breath and/or Wheezing  aluminum hydroxide/magnesium hydroxide/simethicone Suspension 30 milliLiter(s) Oral every 6 hours PRN Dyspepsia  ondansetron Injectable 4 milliGRAM(s) IV Push every 6 hours PRN Nausea    Allergies    IV dye (Rash)  penicillin (Rash)    Intolerances        PHYSICAL EXAM:  Constitutional: NAD  HEENT: Normocephalic, EOMI  Neck:  No JVD  Respiratory: CTA B/L, No wheezes  Cardiovascular: S1, S2, RRR, + systolic murmur  Gastrointestinal: BS+, soft, NT/ND  Extremities: No peripheral edema  Neurological: AAOX3, no focal deficits  Psychiatric: Normal mood, normal affect  : No Barone    Vital Signs Last 24 Hrs  T(C): 36.3 (26 Dec 2017 12:35), Max: 36.4 (26 Dec 2017 04:49)  T(F): 97.3 (26 Dec 2017 12:35), Max: 97.5 (26 Dec 2017 04:49)  HR: 93 (26 Dec 2017 12:35) (93 - 98)  BP: 115/73 (26 Dec 2017 12:35) (115/73 - 128/85)  BP(mean): --  RR: 18 (26 Dec 2017 12:35) (18 - 19)  SpO2: 97% (26 Dec 2017 12:35) (96% - 97%)  I&O's Summary    25 Dec 2017 07:01  -  26 Dec 2017 07:00  --------------------------------------------------------  IN: 390 mL / OUT: 0 mL / NET: 390 mL    26 Dec 2017 07:01  -  26 Dec 2017 17:45  --------------------------------------------------------  IN: 560 mL / OUT: 375 mL / NET: 185 mL DASH Diet

## 2023-10-13 NOTE — DISCHARGE NOTE PROVIDER - PROVIDER TOKENS
FREE:[LAST:[PCP],FIRST:[PCP],PHONE:[(   )    -],FAX:[(   )    -],ADDRESS:[Follow up with your PCP outpatient.]] FREE:[LAST:[PCP],FIRST:[PCP],PHONE:[(   )    -],FAX:[(   )    -],ADDRESS:[Follow up with your PCP outpatient.]],FREE:[LAST:[pulmonary doctor],PHONE:[(   )    -],FAX:[(   )    -],ADDRESS:[[address] [place] [address]]] FREE:[LAST:[PCP],FIRST:[PCP],PHONE:[(   )    -],FAX:[(   )    -],ADDRESS:[Follow up with your PCP outpatient.]],FREE:[LAST:[pulmonary doctor],PHONE:[(   )    -],FAX:[(   )    -],ADDRESS:[pulmonary doctor,   [address] [place] [address]  [phone]]]

## 2023-10-13 NOTE — DISCHARGE NOTE PROVIDER - NSDCMRMEDTOKEN_GEN_ALL_CORE_FT
albuterol 2.5 mg/3 mL (0.083%) inhalation solution: 3 milliliter(s) by nebulizer every 4 hours, dispense 1 box  Aldactone 25 mg oral tablet: 1 tab(s) orally once a day  atorvastatin 20 mg oral tablet: 1 tab(s) orally once a day (at bedtime)  Eliquis 5 mg oral tablet: 1 tab(s) orally 2 times a day  Entresto 49 mg-51 mg oral tablet: 1 tab(s) orally 2 times a day  furosemide 40 mg oral tablet: 1 tab(s) orally once a day (in the morning)  metoprolol tartrate 100 mg oral tablet: 1 tab(s) orally 2 times a day   apixaban 5 mg oral tablet: 1 tab(s) orally every 12 hours  atorvastatin 20 mg oral tablet: 1 tab(s) orally once a day (at bedtime)  dilTIAZem 30 mg oral tablet: 1 tab(s) orally 4 times a day  Entresto 49 mg-51 mg oral tablet: 1 tab(s) orally 2 times a day  furosemide 40 mg oral tablet: 1 tab(s) orally 2 times a day  ipratropium-albuterol 0.5 mg-2.5 mg/3 mL inhalation solution: 3 milliliter(s) inhaled every 6 hours As needed Shortness of Breath and/or Wheezing  melatonin 3 mg oral tablet: 1 tab(s) orally once a day (at bedtime) As needed Insomnia  metoprolol tartrate 100 mg oral tablet: 1 tab(s) orally 2 times a day  senna leaf extract oral tablet: 2 tab(s) orally once a day (at bedtime)  spironolactone 25 mg oral tablet: 1 tab(s) orally once a day

## 2023-10-13 NOTE — DISCHARGE NOTE PROVIDER - CARE PROVIDER_API CALL
PCP, PCP  Follow up with your PCP outpatient.  Phone: (   )    -  Fax: (   )    -  Follow Up Time:    PCP, PCP  Follow up with your PCP outpatient.  Phone: (   )    -  Fax: (   )    -  Follow Up Time:     pulmonary doctor,   [address] [place] [address]  Phone: (   )    -  Fax: (   )    -  Follow Up Time:    PCP, PCP  Follow up with your PCP outpatient.  Phone: (   )    -  Fax: (   )    -  Follow Up Time:     pulmonary doctor,   pulmonary doctor,   [address] [place] [address]  [phone]  Phone: (   )    -  Fax: (   )    -  Follow Up Time:

## 2023-10-14 LAB
GLUCOSE BLDC GLUCOMTR-MCNC: 120 MG/DL — HIGH (ref 70–99)
GLUCOSE BLDC GLUCOMTR-MCNC: 124 MG/DL — HIGH (ref 70–99)
GLUCOSE BLDC GLUCOMTR-MCNC: 126 MG/DL — HIGH (ref 70–99)
GLUCOSE BLDC GLUCOMTR-MCNC: 188 MG/DL — HIGH (ref 70–99)

## 2023-10-14 PROCEDURE — 99231 SBSQ HOSP IP/OBS SF/LOW 25: CPT

## 2023-10-14 RX ADMIN — SACUBITRIL AND VALSARTAN 1 TABLET(S): 24; 26 TABLET, FILM COATED ORAL at 05:43

## 2023-10-14 RX ADMIN — Medication 30 MILLIGRAM(S): at 00:11

## 2023-10-14 RX ADMIN — Medication 40 MILLIGRAM(S): at 13:11

## 2023-10-14 RX ADMIN — Medication 30 MILLIGRAM(S): at 17:37

## 2023-10-14 RX ADMIN — Medication 30 MILLIGRAM(S): at 05:44

## 2023-10-14 RX ADMIN — Medication 100 MILLIGRAM(S): at 17:37

## 2023-10-14 RX ADMIN — Medication 40 MILLIGRAM(S): at 05:44

## 2023-10-14 RX ADMIN — SACUBITRIL AND VALSARTAN 1 TABLET(S): 24; 26 TABLET, FILM COATED ORAL at 17:36

## 2023-10-14 RX ADMIN — Medication 100 MILLIGRAM(S): at 22:00

## 2023-10-14 RX ADMIN — Medication 100 MILLIGRAM(S): at 05:44

## 2023-10-14 RX ADMIN — Medication 3 MILLIGRAM(S): at 22:01

## 2023-10-14 RX ADMIN — APIXABAN 5 MILLIGRAM(S): 2.5 TABLET, FILM COATED ORAL at 17:37

## 2023-10-14 RX ADMIN — Medication 30 MILLIGRAM(S): at 23:46

## 2023-10-14 RX ADMIN — Medication 1: at 08:06

## 2023-10-14 RX ADMIN — Medication 30 MILLIGRAM(S): at 11:36

## 2023-10-14 RX ADMIN — ATORVASTATIN CALCIUM 20 MILLIGRAM(S): 80 TABLET, FILM COATED ORAL at 22:01

## 2023-10-14 RX ADMIN — Medication 100 MILLIGRAM(S): at 13:11

## 2023-10-14 RX ADMIN — SENNA PLUS 2 TABLET(S): 8.6 TABLET ORAL at 22:00

## 2023-10-14 RX ADMIN — Medication 100 MILLIGRAM(S): at 05:43

## 2023-10-14 RX ADMIN — APIXABAN 5 MILLIGRAM(S): 2.5 TABLET, FILM COATED ORAL at 05:44

## 2023-10-14 RX ADMIN — SPIRONOLACTONE 25 MILLIGRAM(S): 25 TABLET, FILM COATED ORAL at 05:44

## 2023-10-14 NOTE — PROGRESS NOTE ADULT - SUBJECTIVE AND OBJECTIVE BOX
Patient is a 78y old  Female who presents with a chief complaint of acute respiratory failure with hypoxia/hypercapnia, CHF exacerbation, Afib with RVR (09 Oct 2023 18:51)      INTERVAL HPI/OVERNIGHT EVENTS:     MEDICATIONS  (STANDING):  apixaban 5 milliGRAM(s) Oral every 12 hours  atorvastatin 20 milliGRAM(s) Oral at bedtime  benzonatate 100 milliGRAM(s) Oral every 8 hours  dextrose 5%. 1000 milliLiter(s) (50 mL/Hr) IV Continuous <Continuous>  dextrose 5%. 1000 milliLiter(s) (100 mL/Hr) IV Continuous <Continuous>  dextrose 50% Injectable 12.5 Gram(s) IV Push once  dextrose 50% Injectable 25 Gram(s) IV Push once  dextrose 50% Injectable 25 Gram(s) IV Push once  diltiazem    Tablet 30 milliGRAM(s) Oral four times a day  furosemide    Tablet 40 milliGRAM(s) Oral two times a day  glucagon  Injectable 1 milliGRAM(s) IntraMuscular once  influenza  Vaccine (HIGH DOSE) 0.7 milliLiter(s) IntraMuscular once  insulin lispro (ADMELOG) corrective regimen sliding scale   SubCutaneous three times a day before meals  insulin lispro (ADMELOG) corrective regimen sliding scale   SubCutaneous at bedtime  metoprolol tartrate 100 milliGRAM(s) Oral two times a day  sacubitril 49 mG/valsartan 51 mG 1 Tablet(s) Oral two times a day  senna 2 Tablet(s) Oral at bedtime  spironolactone 25 milliGRAM(s) Oral daily    MEDICATIONS  (PRN):  acetaminophen     Tablet .. 650 milliGRAM(s) Oral every 6 hours PRN Mild Pain (1 - 3), Moderate Pain (4 - 6)  albuterol/ipratropium for Nebulization 3 milliLiter(s) Nebulizer every 6 hours PRN Shortness of Breath and/or Wheezing  dextrose Oral Gel 15 Gram(s) Oral once PRN Blood Glucose LESS THAN 70 milliGRAM(s)/deciliter  melatonin 3 milliGRAM(s) Oral at bedtime PRN Insomnia      Allergies    No Known Allergies    Intolerances    Vital Signs Last 24 Hrs  T(C): 36.8 (14 Oct 2023 11:15), Max: 36.8 (13 Oct 2023 15:37)  T(F): 98.3 (14 Oct 2023 11:15), Max: 98.3 (13 Oct 2023 15:37)  HR: 71 (14 Oct 2023 11:15) (71 - 94)  BP: 114/75 (14 Oct 2023 11:15) (114/75 - 141/85)  BP(mean): --  RR: 16 (14 Oct 2023 11:15) (16 - 18)  SpO2: 96% (14 Oct 2023 11:15) (92% - 100%)    Parameters below as of 14 Oct 2023 04:50  Patient On (Oxygen Delivery Method): BiPAP/CPAP      PHYSICAL EXAM:  GENERAL: NAD, well-groomed, well-developed  HEAD:  Atraumatic, Normocephalic  EYES: EOMI, PERRLA, conjunctiva and sclera clear  ENMT: No tonsillar erythema, exudates, or enlargement; Moist mucous membranes, Good dentition, No lesions  NECK: Supple,   NERVOUS SYSTEM:  Alert & Oriented X3, Good concentration; Motor Strength 4/5 B/L upper and lower extremities; DTRs 2+ intact and symmetric  CHEST/LUNG: mild scattered wheezing   HEART: Regular rate and rhythm; No murmurs, rubs, or gallops  ABDOMEN: Soft, Nontender, Nondistended; Bowel sounds present  EXTREMITIES:  2+ Peripheral Pulses, No clubbing, cyanosis, +edema  SKIN: No rashes or lesions    LABS:                                  Urinalysis Basic - ( 10 Oct 2023 07:22 )    Color: x / Appearance: x / SG: x / pH: x  Gluc: 113 mg/dL / Ketone: x  / Bili: x / Urobili: x   Blood: x / Protein: x / Nitrite: x   Leuk Esterase: x / RBC: x / WBC x   Sq Epi: x / Non Sq Epi: x / Bacteria: x      CAPILLARY BLOOD GLUCOSE    CAPILLARY BLOOD GLUCOSE      POCT Blood Glucose.: 124 mg/dL (14 Oct 2023 11:09)  POCT Blood Glucose.: 188 mg/dL (14 Oct 2023 07:59)  POCT Blood Glucose.: 144 mg/dL (13 Oct 2023 21:20)  POCT Blood Glucose.: 97 mg/dL (13 Oct 2023 16:47)        RADIOLOGY & ADDITIONAL TESTS:  < from: TTE Echo Complete w/o Contrast w/ Doppler (10.03.23 @ 12:02) >  Summary:   1. Left ventricular ejection fraction, by visual estimation, is 45 to   50%.   2. Mildly decreased global left ventricular systolic function.   3. Mildly enlarged right ventricle.   4. No evidence of mitral valve regurgitation.   5. Moderate tricuspid regurgitation.   6. Estimated pulmonary artery systolic pressure is 48.3 mmHg assuming a   right atrial pressure of 5 mmHg, which is consistent with moderate   pulmonary hypertension.      < end of copied text >      Imaging Personally Reviewed:  [ ] YES  [ ] NO    Consultant(s) Notes Reviewed:  [ ] YES  [ ] NO    Care Discussed with Consultants/Other Providers [ ] YES  [ ] NO

## 2023-10-14 NOTE — PROGRESS NOTE ADULT - ASSESSMENT
78 years old female with pmhhx of morbid obesity, HTN, HLD, CHF, A. fib, COPD, LEIDY presented w/ dyspnea due to acute respiratory failure with hypoxia and hypercapnia due to acute on chronic systolic congestive heart failure and CAP, initially requiring BIPAP but now on 0-2L NC. Hospital course complicated by afib w/ RVR       # Acute on chronic systolic congestive heart failure   continue with medication regimen    - c/w BiPAP QHS  # afib-rate controlled continue with ac and cardizem     # HTN- bp stable  # HLD- continue with statin    # acute hypoxemic respiratory failure  - c/w BiPAP QHS  # CAP- s/p antibx     # pulmonary nodule-4mm left nodule at apex   will need outpt f/u with pulmonary and subsequent scan     # Euthyroid sick syndrome  - TSH is 0.133, free thyroxine is 1.2     Dispo: [facility] - in process of obtaining authorization    10/14/2022 was accepted at facility on 10/13/19306 then facility called  back to say no bed available see Sw note fr more details

## 2023-10-15 LAB
GLUCOSE BLDC GLUCOMTR-MCNC: 115 MG/DL — HIGH (ref 70–99)
GLUCOSE BLDC GLUCOMTR-MCNC: 165 MG/DL — HIGH (ref 70–99)
GLUCOSE BLDC GLUCOMTR-MCNC: 186 MG/DL — HIGH (ref 70–99)
GLUCOSE BLDC GLUCOMTR-MCNC: 91 MG/DL — SIGNIFICANT CHANGE UP (ref 70–99)

## 2023-10-15 PROCEDURE — 99231 SBSQ HOSP IP/OBS SF/LOW 25: CPT

## 2023-10-15 RX ADMIN — Medication 30 MILLIGRAM(S): at 05:13

## 2023-10-15 RX ADMIN — Medication 30 MILLIGRAM(S): at 11:22

## 2023-10-15 RX ADMIN — SACUBITRIL AND VALSARTAN 1 TABLET(S): 24; 26 TABLET, FILM COATED ORAL at 05:12

## 2023-10-15 RX ADMIN — Medication 1: at 11:22

## 2023-10-15 RX ADMIN — Medication 40 MILLIGRAM(S): at 13:05

## 2023-10-15 RX ADMIN — Medication 100 MILLIGRAM(S): at 17:47

## 2023-10-15 RX ADMIN — SACUBITRIL AND VALSARTAN 1 TABLET(S): 24; 26 TABLET, FILM COATED ORAL at 17:47

## 2023-10-15 RX ADMIN — Medication 100 MILLIGRAM(S): at 13:05

## 2023-10-15 RX ADMIN — Medication 100 MILLIGRAM(S): at 21:58

## 2023-10-15 RX ADMIN — Medication 100 MILLIGRAM(S): at 05:12

## 2023-10-15 RX ADMIN — ATORVASTATIN CALCIUM 20 MILLIGRAM(S): 80 TABLET, FILM COATED ORAL at 21:58

## 2023-10-15 RX ADMIN — APIXABAN 5 MILLIGRAM(S): 2.5 TABLET, FILM COATED ORAL at 05:12

## 2023-10-15 RX ADMIN — Medication 30 MILLIGRAM(S): at 23:44

## 2023-10-15 RX ADMIN — Medication 30 MILLIGRAM(S): at 17:47

## 2023-10-15 RX ADMIN — Medication 40 MILLIGRAM(S): at 05:17

## 2023-10-15 RX ADMIN — APIXABAN 5 MILLIGRAM(S): 2.5 TABLET, FILM COATED ORAL at 17:47

## 2023-10-15 RX ADMIN — SPIRONOLACTONE 25 MILLIGRAM(S): 25 TABLET, FILM COATED ORAL at 05:12

## 2023-10-15 NOTE — PROGRESS NOTE ADULT - ASSESSMENT
78 years old female with pmhhx of morbid obesity, HTN, HLD, CHF, A. fib, COPD, LEIDY presented w/ dyspnea due to acute respiratory failure with hypoxia and hypercapnia due to acute on chronic systolic congestive heart failure and CAP, initially requiring BIPAP but now on 0-2L NC. Hospital course complicated by afib w/ RVR       # Acute on chronic systolic congestive heart failure   continue with medication regimen    - c/w BiPAP QHS  # afib-rate controlled continue with ac and cardizem     # HTN- bp stable  # HLD- continue with statin    # acute hypoxemic respiratory failure  - c/w BiPAP QHS  # CAP- s/p antibx     # pulmonary nodule-4mm left nodule at apex   will need outpt f/u with pulmonary and subsequent scan     # Euthyroid sick syndrome  - TSH is 0.133, free thyroxine is 1.2     Dispo: [facility] - in process of obtaining authorization    10/14/2022 was accepted at facility on 10/13/19564 then facility called  back to say no bed available see Sw note fr more details

## 2023-10-15 NOTE — PROGRESS NOTE ADULT - SUBJECTIVE AND OBJECTIVE BOX
Patient is a 78y old  Female who presents with a chief complaint of acute respiratory failure with hypoxia/hypercapnia, CHF exacerbation, Afib with RVR (09 Oct 2023 18:51)      INTERVAL HPI/OVERNIGHT EVENTS:     MEDICATIONS  (STANDING):  apixaban 5 milliGRAM(s) Oral every 12 hours  atorvastatin 20 milliGRAM(s) Oral at bedtime  benzonatate 100 milliGRAM(s) Oral every 8 hours  dextrose 5%. 1000 milliLiter(s) (50 mL/Hr) IV Continuous <Continuous>  dextrose 5%. 1000 milliLiter(s) (100 mL/Hr) IV Continuous <Continuous>  dextrose 50% Injectable 12.5 Gram(s) IV Push once  dextrose 50% Injectable 25 Gram(s) IV Push once  dextrose 50% Injectable 25 Gram(s) IV Push once  diltiazem    Tablet 30 milliGRAM(s) Oral four times a day  furosemide    Tablet 40 milliGRAM(s) Oral two times a day  glucagon  Injectable 1 milliGRAM(s) IntraMuscular once  influenza  Vaccine (HIGH DOSE) 0.7 milliLiter(s) IntraMuscular once  insulin lispro (ADMELOG) corrective regimen sliding scale   SubCutaneous three times a day before meals  insulin lispro (ADMELOG) corrective regimen sliding scale   SubCutaneous at bedtime  metoprolol tartrate 100 milliGRAM(s) Oral two times a day  sacubitril 49 mG/valsartan 51 mG 1 Tablet(s) Oral two times a day  senna 2 Tablet(s) Oral at bedtime  spironolactone 25 milliGRAM(s) Oral daily    MEDICATIONS  (PRN):  acetaminophen     Tablet .. 650 milliGRAM(s) Oral every 6 hours PRN Mild Pain (1 - 3), Moderate Pain (4 - 6)  albuterol/ipratropium for Nebulization 3 milliLiter(s) Nebulizer every 6 hours PRN Shortness of Breath and/or Wheezing  dextrose Oral Gel 15 Gram(s) Oral once PRN Blood Glucose LESS THAN 70 milliGRAM(s)/deciliter  melatonin 3 milliGRAM(s) Oral at bedtime PRN Insomnia      Allergies    No Known Allergies    Intolerances  Vital Signs Last 24 Hrs  T(C): 36.3 (15 Oct 2023 10:29), Max: 36.8 (14 Oct 2023 16:14)  T(F): 97.4 (15 Oct 2023 10:29), Max: 98.3 (14 Oct 2023 16:14)  HR: 77 (15 Oct 2023 10:29) (70 - 86)  BP: 121/76 (15 Oct 2023 10:29) (121/76 - 147/117)  BP(mean): --  RR: 18 (15 Oct 2023 10:29) (18 - 19)  SpO2: 99% (15 Oct 2023 10:29) (94% - 99%)    Parameters below as of 15 Oct 2023 00:29  Patient On (Oxygen Delivery Method): room air        PHYSICAL EXAM:  GENERAL: NAD, well-groomed, well-developed  HEAD:  Atraumatic, Normocephalic  EYES: EOMI, PERRLA, conjunctiva and sclera clear  ENMT: No tonsillar erythema, exudates, or enlargement; Moist mucous membranes, Good dentition, No lesions  NECK: Supple,   NERVOUS SYSTEM:  Alert & Oriented X3, Good concentration; Motor Strength 4/5 B/L upper and lower extremities; DTRs 2+ intact and symmetric  CHEST/LUNG: mild scattered wheezing   HEART: Regular rate and rhythm; No murmurs, rubs, or gallops  ABDOMEN: Soft, Nontender, Nondistended; Bowel sounds present  EXTREMITIES:  2+ Peripheral Pulses, No clubbing, cyanosis, +edema  SKIN: No rashes or lesions    LABS:                                  Urinalysis Basic - ( 10 Oct 2023 07:22 )    Color: x / Appearance: x / SG: x / pH: x  Gluc: 113 mg/dL / Ketone: x  / Bili: x / Urobili: x   Blood: x / Protein: x / Nitrite: x   Leuk Esterase: x / RBC: x / WBC x   Sq Epi: x / Non Sq Epi: x / Bacteria: x      CAPILLARY BLOOD GLUCOSE        POCT Blood Glucose.: 186 mg/dL (15 Oct 2023 11:01)  POCT Blood Glucose.: 115 mg/dL (15 Oct 2023 07:34)  POCT Blood Glucose.: 126 mg/dL (14 Oct 2023 22:02)  POCT Blood Glucose.: 120 mg/dL (14 Oct 2023 16:57)      RADIOLOGY & ADDITIONAL TESTS:  < from: TTE Echo Complete w/o Contrast w/ Doppler (10.03.23 @ 12:02) >  Summary:   1. Left ventricular ejection fraction, by visual estimation, is 45 to   50%.   2. Mildly decreased global left ventricular systolic function.   3. Mildly enlarged right ventricle.   4. No evidence of mitral valve regurgitation.   5. Moderate tricuspid regurgitation.   6. Estimated pulmonary artery systolic pressure is 48.3 mmHg assuming a   right atrial pressure of 5 mmHg, which is consistent with moderate   pulmonary hypertension.      < end of copied text >      Imaging Personally Reviewed:  [ ] YES  [ ] NO    Consultant(s) Notes Reviewed:  [ ] YES  [ ] NO    Care Discussed with Consultants/Other Providers [ ] YES  [ ] NO

## 2023-10-16 ENCOUNTER — TRANSCRIPTION ENCOUNTER (OUTPATIENT)
Age: 78
End: 2023-10-16

## 2023-10-16 VITALS
RESPIRATION RATE: 18 BRPM | OXYGEN SATURATION: 95 % | HEART RATE: 71 BPM | DIASTOLIC BLOOD PRESSURE: 72 MMHG | TEMPERATURE: 97 F | SYSTOLIC BLOOD PRESSURE: 117 MMHG

## 2023-10-16 LAB
GLUCOSE BLDC GLUCOMTR-MCNC: 107 MG/DL — HIGH (ref 70–99)
GLUCOSE BLDC GLUCOMTR-MCNC: 128 MG/DL — HIGH (ref 70–99)

## 2023-10-16 PROCEDURE — 99239 HOSP IP/OBS DSCHRG MGMT >30: CPT

## 2023-10-16 RX ORDER — DILTIAZEM HCL 120 MG
1 CAPSULE, EXT RELEASE 24 HR ORAL
Qty: 0 | Refills: 0 | DISCHARGE
Start: 2023-10-16

## 2023-10-16 RX ORDER — APIXABAN 2.5 MG/1
1 TABLET, FILM COATED ORAL
Qty: 0 | Refills: 0 | DISCHARGE
Start: 2023-10-16

## 2023-10-16 RX ORDER — METOPROLOL TARTRATE 50 MG
1 TABLET ORAL
Qty: 0 | Refills: 0 | DISCHARGE

## 2023-10-16 RX ORDER — APIXABAN 2.5 MG/1
1 TABLET, FILM COATED ORAL
Qty: 0 | Refills: 0 | DISCHARGE

## 2023-10-16 RX ORDER — IPRATROPIUM/ALBUTEROL SULFATE 18-103MCG
3 AEROSOL WITH ADAPTER (GRAM) INHALATION
Qty: 0 | Refills: 0 | DISCHARGE
Start: 2023-10-16

## 2023-10-16 RX ORDER — SPIRONOLACTONE 25 MG/1
1 TABLET, FILM COATED ORAL
Qty: 0 | Refills: 0 | DISCHARGE
Start: 2023-10-16

## 2023-10-16 RX ORDER — LANOLIN ALCOHOL/MO/W.PET/CERES
1 CREAM (GRAM) TOPICAL
Qty: 0 | Refills: 0 | DISCHARGE
Start: 2023-10-16

## 2023-10-16 RX ORDER — ATORVASTATIN CALCIUM 80 MG/1
1 TABLET, FILM COATED ORAL
Qty: 0 | Refills: 0 | DISCHARGE
Start: 2023-10-16

## 2023-10-16 RX ORDER — FUROSEMIDE 40 MG
1 TABLET ORAL
Qty: 0 | Refills: 0 | DISCHARGE

## 2023-10-16 RX ORDER — ATORVASTATIN CALCIUM 80 MG/1
1 TABLET, FILM COATED ORAL
Refills: 0 | DISCHARGE

## 2023-10-16 RX ORDER — SENNA PLUS 8.6 MG/1
2 TABLET ORAL
Qty: 0 | Refills: 0 | DISCHARGE
Start: 2023-10-16

## 2023-10-16 RX ORDER — METOPROLOL TARTRATE 50 MG
1 TABLET ORAL
Qty: 0 | Refills: 0 | DISCHARGE
Start: 2023-10-16

## 2023-10-16 RX ORDER — FUROSEMIDE 40 MG
1 TABLET ORAL
Qty: 0 | Refills: 0 | DISCHARGE
Start: 2023-10-16

## 2023-10-16 RX ORDER — SPIRONOLACTONE 25 MG/1
1 TABLET, FILM COATED ORAL
Refills: 0 | DISCHARGE

## 2023-10-16 RX ADMIN — Medication 100 MILLIGRAM(S): at 05:48

## 2023-10-16 RX ADMIN — SPIRONOLACTONE 25 MILLIGRAM(S): 25 TABLET, FILM COATED ORAL at 05:49

## 2023-10-16 RX ADMIN — Medication 100 MILLIGRAM(S): at 05:49

## 2023-10-16 RX ADMIN — SACUBITRIL AND VALSARTAN 1 TABLET(S): 24; 26 TABLET, FILM COATED ORAL at 05:48

## 2023-10-16 RX ADMIN — APIXABAN 5 MILLIGRAM(S): 2.5 TABLET, FILM COATED ORAL at 05:48

## 2023-10-16 RX ADMIN — Medication 30 MILLIGRAM(S): at 05:48

## 2023-10-16 RX ADMIN — Medication 30 MILLIGRAM(S): at 11:56

## 2023-10-16 RX ADMIN — Medication 40 MILLIGRAM(S): at 05:48

## 2023-10-16 NOTE — PROGRESS NOTE ADULT - SUBJECTIVE AND OBJECTIVE BOX
Patient is a 78y old  Female who presents with a chief complaint of acute respiratory failure with hypoxia/hypercapnia, CHF exacerbation, Afib with RVR (09 Oct 2023 18:51)      INTERVAL HPI/OVERNIGHT EVENTS:   none'  MEDICATIONS  (STANDING):  apixaban 5 milliGRAM(s) Oral every 12 hours  atorvastatin 20 milliGRAM(s) Oral at bedtime  benzonatate 100 milliGRAM(s) Oral every 8 hours  dextrose 5%. 1000 milliLiter(s) (50 mL/Hr) IV Continuous <Continuous>  dextrose 5%. 1000 milliLiter(s) (100 mL/Hr) IV Continuous <Continuous>  dextrose 50% Injectable 12.5 Gram(s) IV Push once  dextrose 50% Injectable 25 Gram(s) IV Push once  dextrose 50% Injectable 25 Gram(s) IV Push once  diltiazem    Tablet 30 milliGRAM(s) Oral four times a day  furosemide    Tablet 40 milliGRAM(s) Oral two times a day  glucagon  Injectable 1 milliGRAM(s) IntraMuscular once  influenza  Vaccine (HIGH DOSE) 0.7 milliLiter(s) IntraMuscular once  insulin lispro (ADMELOG) corrective regimen sliding scale   SubCutaneous three times a day before meals  insulin lispro (ADMELOG) corrective regimen sliding scale   SubCutaneous at bedtime  metoprolol tartrate 100 milliGRAM(s) Oral two times a day  sacubitril 49 mG/valsartan 51 mG 1 Tablet(s) Oral two times a day  senna 2 Tablet(s) Oral at bedtime  spironolactone 25 milliGRAM(s) Oral daily    MEDICATIONS  (PRN):  acetaminophen     Tablet .. 650 milliGRAM(s) Oral every 6 hours PRN Mild Pain (1 - 3), Moderate Pain (4 - 6)  albuterol/ipratropium for Nebulization 3 milliLiter(s) Nebulizer every 6 hours PRN Shortness of Breath and/or Wheezing  dextrose Oral Gel 15 Gram(s) Oral once PRN Blood Glucose LESS THAN 70 milliGRAM(s)/deciliter  melatonin 3 milliGRAM(s) Oral at bedtime PRN Insomnia      Allergies    No Known Allergies    Vital Signs Last 24 Hrs  T(C): 36.7 (16 Oct 2023 04:47), Max: 36.7 (16 Oct 2023 04:47)  T(F): 98.1 (16 Oct 2023 04:47), Max: 98.1 (16 Oct 2023 04:47)  HR: 72 (16 Oct 2023 08:58) (71 - 81)  BP: 114/79 (16 Oct 2023 04:47) (109/78 - 129/79)  BP(mean): --  RR: 20 (16 Oct 2023 04:47) (18 - 20)  SpO2: 94% (16 Oct 2023 08:58) (93% - 100%)    Parameters below as of 16 Oct 2023 07:00  Patient On (Oxygen Delivery Method): room air        PHYSICAL EXAM:  GENERAL: NAD, well-groomed, well-developed  HEAD:  Atraumatic, Normocephalic  EYES: EOMI, PERRLA, conjunctiva and sclera clear  ENMT: No tonsillar erythema, exudates, or enlargement; Moist mucous membranes, Good dentition, No lesions  NECK: Supple,   NERVOUS SYSTEM:  Alert & Oriented X3, Good concentration; Motor Strength 4/5 B/L upper and lower extremities; DTRs 2+ intact and symmetric  CHEST/LUNG: mild scattered wheezing   HEART: Regular rate and rhythm; No murmurs, rubs, or gallops  ABDOMEN: Soft, Nontender, Nondistended; Bowel sounds present  EXTREMITIES:  2+ Peripheral Pulses, No clubbing, cyanosis, +edema  SKIN: No rashes or lesions    LABS:                                  Urinalysis Basic - ( 10 Oct 2023 07:22 )    Color: x / Appearance: x / SG: x / pH: x  Gluc: 113 mg/dL / Ketone: x  / Bili: x / Urobili: x   Blood: x / Protein: x / Nitrite: x   Leuk Esterase: x / RBC: x / WBC x   Sq Epi: x / Non Sq Epi: x / Bacteria: x      CAPILLARY BLOOD GLUCOSE        POCT Blood Glucose.: 186 mg/dL (15 Oct 2023 11:01)  POCT Blood Glucose.: 115 mg/dL (15 Oct 2023 07:34)  POCT Blood Glucose.: 126 mg/dL (14 Oct 2023 22:02)  POCT Blood Glucose.: 120 mg/dL (14 Oct 2023 16:57)      RADIOLOGY & ADDITIONAL TESTS:  < from: TTE Echo Complete w/o Contrast w/ Doppler (10.03.23 @ 12:02) >  Summary:   1. Left ventricular ejection fraction, by visual estimation, is 45 to   50%.   2. Mildly decreased global left ventricular systolic function.   3. Mildly enlarged right ventricle.   4. No evidence of mitral valve regurgitation.   5. Moderate tricuspid regurgitation.   6. Estimated pulmonary artery systolic pressure is 48.3 mmHg assuming a   right atrial pressure of 5 mmHg, which is consistent with moderate   pulmonary hypertension.      < end of copied text >      Imaging Personally Reviewed:  [ ] YES  [ ] NO    Consultant(s) Notes Reviewed:  [ ] YES  [ ] NO    Care Discussed with Consultants/Other Providers [ ] YES  [ ] NO

## 2023-10-16 NOTE — DISCHARGE NOTE NURSING/CASE MANAGEMENT/SOCIAL WORK - PATIENT PORTAL LINK FT
You can access the FollowMyHealth Patient Portal offered by [facility] by registering at the following website: [email or web address]. By joining [employer] [facility]’s FollowMyHealth portal, you will also be able to view your health information using other applications (apps) compatible with our system.

## 2023-10-16 NOTE — CHART NOTE - NSCHARTNOTEFT_GEN_A_CORE
77F w/ HTN, ?COPD, CHF, Afib, LEIDY on CPAP. Presents w/ SOB and cough. Pt placed on BiPAP in ED, given steroids and diuretics. ABG showed mild acute on chronic hypercapnia and hypoxemia. POCUS performed at bedside revealed A line predominance w/ bibasilar consolidation patterns concerned for possible pneumonia vs pulmonary edema    DX: CHF, atrial fibrillation, LEIDY, acute on chronic hypoxic/hypercapnic respiratory failure      - s/p aggressive IV diuresis, now clinically improved, transitioned to PO lasix and tolerating room air  - goal sat >92%  - continue NIV HS for chronic hypercapnia  - unclear hx of COPD vs asthma - pt never smoked, may have asthma not COPD, cont duonebs PRN for wheezing  - will need pulmonary follow up upon discharge from rehab, can f/u with [address] if pt chooses (she previously had her own pulmonologist but does not recall name)  - stable for discharge to rehab  - pulmonary to sign off 77F w/ HTN, ?COPD, CHF, Afib, LEIDY on CPAP. Presents w/ SOB and cough. Pt placed on BiPAP in ED, given steroids and diuretics. ABG showed mild acute on chronic hypercapnia and hypoxemia. POCUS performed at bedside revealed A line predominance w/ bibasilar consolidation patterns concerned for possible pneumonia vs pulmonary edema    DX: CHF, atrial fibrillation, LEIDY, acute on chronic hypoxic/hypercapnic respiratory failure      - s/p aggressive IV diuresis, now clinically improved, transitioned to PO lasix and tolerating room air  - goal sat >92%  - continue NIV HS for chronic hypercapnia  - unclear hx of COPD vs asthma - pt never smoked, may have asthma not COPD, cont duonebs PRN for wheezing  - will need pulmonary follow up upon discharge from rehab, can f/u with [address] if pt chooses (she previously had her own pulmonologist but does not recall name)  - stable for discharge to rehab  - pulmonary to sign off, please reconsult as necessary

## 2023-10-16 NOTE — PROGRESS NOTE ADULT - ASSESSMENT
78 years old female with pmhhx of morbid obesity, HTN, HLD, CHF, A. fib, COPD, LEIDY presented w/ dyspnea due to acute respiratory failure with hypoxia and hypercapnia due to acute on chronic systolic congestive heart failure and CAP, initially requiring BIPAP but now on 0-2L NC. Hospital course complicated by afib w/ RVR       # Acute on chronic systolic congestive heart failure   continue with medication regimen    - c/w BiPAP QHS  # afib-rate controlled continue with ac and cardizem     # HTN- bp stable  # HLD- continue with statin    # acute hypoxemic respiratory failure  - c/w BiPAP QHS  # CAP- s/p antibx     # pulmonary nodule-4mm left nodule at apex   will need outpt f/u with pulmonary and subsequent scan     # Euthyroid sick syndrome  - TSH is 0.133, free thyroxine is 1.2     Dispo: [facility] - in process of obtaining authorization    10/14/2022 was accepted at facility on 10/13/89645 then facility called  back to say no bed available see Sw note fr more details

## 2023-10-16 NOTE — PROGRESS NOTE ADULT - PROVIDER SPECIALTY LIST ADULT
Hospitalist
Hospitalist
Pulmonology
Hospitalist
Internal Medicine
Pulmonology
Hospitalist
Internal Medicine
Pulmonology
Hospitalist

## 2023-10-16 NOTE — PROGRESS NOTE ADULT - NUTRITIONAL ASSESSMENT
This patient has been assessed with a concern for Malnutrition and has been determined to have a diagnosis/diagnoses of Morbid obesity (BMI > 40).    This patient is being managed with:   Diet DASH/TLC-  Sodium & Cholesterol Restricted  Consistent Carbohydrate {Evening Snack}  1500mL Fluid Restriction ([id number])  Entered: Oct 11 2023 12:02PM  
This patient has been assessed with a concern for Malnutrition and has been determined to have a diagnosis/diagnoses of Morbid obesity (BMI > 40).    This patient is being managed with:   Diet DASH/TLC-  Sodium & Cholesterol Restricted  Consistent Carbohydrate {Evening Snack}  1500mL Fluid Restriction ([id number])  Entered: Oct 11 2023 12:02PM  
This patient has been assessed with a concern for Malnutrition and has been determined to have a diagnosis/diagnoses of Morbid obesity (BMI > 40).    This patient is being managed with:   Diet DASH/TLC-  Sodium & Cholesterol Restricted  Consistent Carbohydrate {Evening Snack}  1500mL Fluid Restriction ([id number])  Entered: Oct 11 2023 12:02PM  
This patient has been assessed with a concern for Malnutrition and has been determined to have a diagnosis/diagnoses of Morbid obesity (BMI > 40).    This patient is being managed with:   Diet DASH/TLC-  Sodium & Cholesterol Restricted  Consistent Carbohydrate {Evening Snack}  1500mL Fluid Restriction ([id number])  Entered: Oct 11 2023 12:02PM  
This patient has been assessed with a concern for Malnutrition and has been determined to have a diagnosis/diagnoses of Morbid obesity (BMI > 40).    This patient is being managed with:   Diet DASH/TLC-  Sodium & Cholesterol Restricted  Consistent Carbohydrate {Evening Snack}  1500mL Fluid Restriction ([id number])  Entered: Oct 11 2023 12:02PM  
This patient has been assessed with a concern for Malnutrition and has been determined to have a diagnosis/diagnoses of Morbid obesity (BMI > 40).    This patient is being managed with:   Diet DASH/TLC-  Sodium & Cholesterol Restricted  Consistent Carbohydrate {Evening Snack}  1500mL Fluid Restriction ([id number])  Entered: Oct 11 2023 12:02PM    Diet DASH/TLC-  Sodium & Cholesterol Restricted  Consistent Carbohydrate {Evening Snack}  Entered: Oct  4 2023  3:18PM    The following pending diet order is being considered for treatment of Morbid obesity (BMI > 40):null
This patient has been assessed with a concern for Malnutrition and has been determined to have a diagnosis/diagnoses of Morbid obesity (BMI > 40).    This patient is being managed with:   Diet DASH/TLC-  Sodium & Cholesterol Restricted  Consistent Carbohydrate {Evening Snack}  1500mL Fluid Restriction ([id number])  Entered: Oct 11 2023 12:02PM

## 2023-10-16 NOTE — DISCHARGE NOTE NURSING/CASE MANAGEMENT/SOCIAL WORK - NSDCPEFALRISK_GEN_ALL_CORE
For information on Fall & Injury Prevention, visit: [email or web address] OR  [email or web address] OR  https://www.cdc.gov/steadi/patient.html

## 2023-10-18 DIAGNOSIS — I48.91 UNSPECIFIED ATRIAL FIBRILLATION: ICD-10-CM

## 2023-10-18 DIAGNOSIS — E78.5 HYPERLIPIDEMIA, UNSPECIFIED: ICD-10-CM

## 2023-10-18 DIAGNOSIS — Z79.01 LONG TERM (CURRENT) USE OF ANTICOAGULANTS: ICD-10-CM

## 2023-10-18 DIAGNOSIS — I50.23 ACUTE ON CHRONIC SYSTOLIC (CONGESTIVE) HEART FAILURE: ICD-10-CM

## 2023-10-18 DIAGNOSIS — I50.9 HEART FAILURE, UNSPECIFIED: ICD-10-CM

## 2023-10-18 DIAGNOSIS — J96.22 ACUTE AND CHRONIC RESPIRATORY FAILURE WITH HYPERCAPNIA: ICD-10-CM

## 2023-10-18 DIAGNOSIS — I11.0 HYPERTENSIVE HEART DISEASE WITH HEART FAILURE: ICD-10-CM

## 2023-10-18 DIAGNOSIS — E07.81 SICK-EUTHYROID SYNDROME: ICD-10-CM

## 2023-10-18 DIAGNOSIS — Z23 ENCOUNTER FOR IMMUNIZATION: ICD-10-CM

## 2023-10-18 DIAGNOSIS — E66.01 MORBID (SEVERE) OBESITY DUE TO EXCESS CALORIES: ICD-10-CM

## 2023-10-18 DIAGNOSIS — R73.9 HYPERGLYCEMIA, UNSPECIFIED: ICD-10-CM

## 2023-10-18 DIAGNOSIS — G47.33 OBSTRUCTIVE SLEEP APNEA (ADULT) (PEDIATRIC): ICD-10-CM

## 2023-10-18 DIAGNOSIS — J96.01 ACUTE RESPIRATORY FAILURE WITH HYPOXIA: ICD-10-CM

## 2023-10-18 DIAGNOSIS — Z20.822 CONTACT WITH AND (SUSPECTED) EXPOSURE TO COVID-19: ICD-10-CM

## 2023-10-18 DIAGNOSIS — R91.1 SOLITARY PULMONARY NODULE: ICD-10-CM

## 2023-10-18 DIAGNOSIS — J18.9 PNEUMONIA, UNSPECIFIED ORGANISM: ICD-10-CM

## 2023-10-18 DIAGNOSIS — J45.909 UNSPECIFIED ASTHMA, UNCOMPLICATED: ICD-10-CM

## 2024-07-11 NOTE — ED ADULT NURSE NOTE - CAS EDP DISCH TYPE
Quality 47: Advance Care Plan: Advance Care Planning discussed and documented; advance care plan or surrogate decision maker documented in the medical record. Detail Level: Detailed Quality 130: Documentation Of Current Medications In The Medical Record: Current Medications Documented Name And Contact Information For Health Care Proxy: Stephon Vargas (son [phone] Home